# Patient Record
Sex: MALE | Race: WHITE | HISPANIC OR LATINO | URBAN - METROPOLITAN AREA
[De-identification: names, ages, dates, MRNs, and addresses within clinical notes are randomized per-mention and may not be internally consistent; named-entity substitution may affect disease eponyms.]

---

## 2023-05-04 ENCOUNTER — INPATIENT (INPATIENT)
Facility: HOSPITAL | Age: 58
LOS: 3 days | Discharge: HOME | End: 2023-05-08
Attending: INTERNAL MEDICINE | Admitting: STUDENT IN AN ORGANIZED HEALTH CARE EDUCATION/TRAINING PROGRAM
Payer: COMMERCIAL

## 2023-05-04 VITALS
RESPIRATION RATE: 16 BRPM | SYSTOLIC BLOOD PRESSURE: 114 MMHG | OXYGEN SATURATION: 100 % | WEIGHT: 169.98 LBS | TEMPERATURE: 99 F | DIASTOLIC BLOOD PRESSURE: 81 MMHG | HEIGHT: 68 IN | HEART RATE: 143 BPM

## 2023-05-04 DIAGNOSIS — K29.71 GASTRITIS, UNSPECIFIED, WITH BLEEDING: ICD-10-CM

## 2023-05-04 DIAGNOSIS — K26.4 CHRONIC OR UNSPECIFIED DUODENAL ULCER WITH HEMORRHAGE: ICD-10-CM

## 2023-05-04 DIAGNOSIS — R09.02 HYPOXEMIA: ICD-10-CM

## 2023-05-04 DIAGNOSIS — K57.91 DIVERTICULOSIS OF INTESTINE, PART UNSPECIFIED, WITHOUT PERFORATION OR ABSCESS WITH BLEEDING: ICD-10-CM

## 2023-05-04 DIAGNOSIS — Z71.6 TOBACCO ABUSE COUNSELING: ICD-10-CM

## 2023-05-04 LAB
ALBUMIN SERPL ELPH-MCNC: 3.1 G/DL — LOW (ref 3.3–5)
ALP SERPL-CCNC: 40 U/L — SIGNIFICANT CHANGE UP (ref 40–120)
ALT FLD-CCNC: 18 U/L — SIGNIFICANT CHANGE UP (ref 12–78)
ANION GAP SERPL CALC-SCNC: 2 MMOL/L — LOW (ref 5–17)
ANISOCYTOSIS BLD QL: SLIGHT — SIGNIFICANT CHANGE UP
APTT BLD: 28 SEC — SIGNIFICANT CHANGE UP (ref 27.5–35.5)
AST SERPL-CCNC: 14 U/L — LOW (ref 15–37)
BASOPHILS # BLD AUTO: 0.05 K/UL — SIGNIFICANT CHANGE UP (ref 0–0.2)
BASOPHILS NFR BLD AUTO: 0.5 % — SIGNIFICANT CHANGE UP (ref 0–2)
BILIRUB SERPL-MCNC: 0.2 MG/DL — SIGNIFICANT CHANGE UP (ref 0.2–1.2)
BLD GP AB SCN SERPL QL: SIGNIFICANT CHANGE UP
BUN SERPL-MCNC: 31 MG/DL — HIGH (ref 7–23)
CALCIUM SERPL-MCNC: 8.1 MG/DL — LOW (ref 8.5–10.1)
CHLORIDE SERPL-SCNC: 107 MMOL/L — SIGNIFICANT CHANGE UP (ref 96–108)
CO2 SERPL-SCNC: 27 MMOL/L — SIGNIFICANT CHANGE UP (ref 22–31)
CREAT SERPL-MCNC: 0.8 MG/DL — SIGNIFICANT CHANGE UP (ref 0.5–1.3)
EGFR: 103 ML/MIN/1.73M2 — SIGNIFICANT CHANGE UP
EOSINOPHIL # BLD AUTO: 0.04 K/UL — SIGNIFICANT CHANGE UP (ref 0–0.5)
EOSINOPHIL NFR BLD AUTO: 0.4 % — SIGNIFICANT CHANGE UP (ref 0–6)
GLUCOSE SERPL-MCNC: 127 MG/DL — HIGH (ref 70–99)
HCT VFR BLD CALC: 20.2 % — CRITICAL LOW (ref 39–50)
HGB BLD-MCNC: 6.9 G/DL — CRITICAL LOW (ref 13–17)
HYPOCHROMIA BLD QL: SLIGHT — SIGNIFICANT CHANGE UP
IMM GRANULOCYTES NFR BLD AUTO: 0.4 % — SIGNIFICANT CHANGE UP (ref 0–0.9)
INR BLD: 1.07 RATIO — SIGNIFICANT CHANGE UP (ref 0.88–1.16)
LYMPHOCYTES # BLD AUTO: 2.48 K/UL — SIGNIFICANT CHANGE UP (ref 1–3.3)
LYMPHOCYTES # BLD AUTO: 27 % — SIGNIFICANT CHANGE UP (ref 13–44)
MANUAL SMEAR VERIFICATION: SIGNIFICANT CHANGE UP
MCHC RBC-ENTMCNC: 31.7 PG — SIGNIFICANT CHANGE UP (ref 27–34)
MCHC RBC-ENTMCNC: 34.2 G/DL — SIGNIFICANT CHANGE UP (ref 32–36)
MCV RBC AUTO: 92.7 FL — SIGNIFICANT CHANGE UP (ref 80–100)
MONOCYTES # BLD AUTO: 0.49 K/UL — SIGNIFICANT CHANGE UP (ref 0–0.9)
MONOCYTES NFR BLD AUTO: 5.3 % — SIGNIFICANT CHANGE UP (ref 2–14)
NEUTROPHILS # BLD AUTO: 6.07 K/UL — SIGNIFICANT CHANGE UP (ref 1.8–7.4)
NEUTROPHILS NFR BLD AUTO: 66.4 % — SIGNIFICANT CHANGE UP (ref 43–77)
NRBC # BLD: 0 /100 WBCS — SIGNIFICANT CHANGE UP (ref 0–0)
PLAT MORPH BLD: NORMAL — SIGNIFICANT CHANGE UP
PLATELET # BLD AUTO: 194 K/UL — SIGNIFICANT CHANGE UP (ref 150–400)
POLYCHROMASIA BLD QL SMEAR: SLIGHT — SIGNIFICANT CHANGE UP
POTASSIUM SERPL-MCNC: 4.1 MMOL/L — SIGNIFICANT CHANGE UP (ref 3.5–5.3)
POTASSIUM SERPL-SCNC: 4.1 MMOL/L — SIGNIFICANT CHANGE UP (ref 3.5–5.3)
PROT SERPL-MCNC: 5.6 GM/DL — LOW (ref 6–8.3)
PROTHROM AB SERPL-ACNC: 12.8 SEC — SIGNIFICANT CHANGE UP (ref 10.5–13.4)
RBC # BLD: 2.18 M/UL — LOW (ref 4.2–5.8)
RBC # FLD: 12.6 % — SIGNIFICANT CHANGE UP (ref 10.3–14.5)
RBC BLD AUTO: ABNORMAL
SODIUM SERPL-SCNC: 136 MMOL/L — SIGNIFICANT CHANGE UP (ref 135–145)
WBC # BLD: 9.17 K/UL — SIGNIFICANT CHANGE UP (ref 3.8–10.5)
WBC # FLD AUTO: 9.17 K/UL — SIGNIFICANT CHANGE UP (ref 3.8–10.5)

## 2023-05-04 PROCEDURE — 99291 CRITICAL CARE FIRST HOUR: CPT

## 2023-05-04 PROCEDURE — 74174 CTA ABD&PLVS W/CONTRAST: CPT | Mod: 26,MA

## 2023-05-04 PROCEDURE — 93010 ELECTROCARDIOGRAM REPORT: CPT

## 2023-05-04 PROCEDURE — 99222 1ST HOSP IP/OBS MODERATE 55: CPT

## 2023-05-04 RX ORDER — ONDANSETRON 8 MG/1
4 TABLET, FILM COATED ORAL EVERY 6 HOURS
Refills: 0 | Status: DISCONTINUED | OUTPATIENT
Start: 2023-05-04 | End: 2023-05-08

## 2023-05-04 RX ORDER — SODIUM CHLORIDE 9 MG/ML
1000 INJECTION INTRAMUSCULAR; INTRAVENOUS; SUBCUTANEOUS ONCE
Refills: 0 | Status: COMPLETED | OUTPATIENT
Start: 2023-05-04 | End: 2023-05-04

## 2023-05-04 RX ORDER — PANTOPRAZOLE SODIUM 20 MG/1
80 TABLET, DELAYED RELEASE ORAL ONCE
Refills: 0 | Status: COMPLETED | OUTPATIENT
Start: 2023-05-04 | End: 2023-05-04

## 2023-05-04 RX ORDER — PANTOPRAZOLE SODIUM 20 MG/1
40 TABLET, DELAYED RELEASE ORAL EVERY 12 HOURS
Refills: 0 | Status: DISCONTINUED | OUTPATIENT
Start: 2023-05-04 | End: 2023-05-08

## 2023-05-04 RX ADMIN — SODIUM CHLORIDE 1000 MILLILITER(S): 9 INJECTION INTRAMUSCULAR; INTRAVENOUS; SUBCUTANEOUS at 19:31

## 2023-05-04 RX ADMIN — PANTOPRAZOLE SODIUM 80 MILLIGRAM(S): 20 TABLET, DELAYED RELEASE ORAL at 19:32

## 2023-05-04 NOTE — H&P ADULT - ASSESSMENT
56yo male with pmh gerd presenting with black stools over past 3-4 days which has never happened before.  Pt with anemia and mild diverticulosis on CT. Admit for Upper GI bleed.

## 2023-05-04 NOTE — H&P ADULT - PROBLEM SELECTOR PLAN 1
- Hgb 6.9  - Per Ed rectal exam with melanotic stool  - NPO  - IV PPI BID  - GI consult in AM.   - 1 unit PRBC given in ED. FU repeat CBC.   - Monitor if any large volume blood loss overnight.

## 2023-05-04 NOTE — ED PROVIDER NOTE - OBJECTIVE STATEMENT
56yo male with pmh gerd presenting with black stools.  For several weeks/ months patient has been having intermittent upper abdominal pain and has been taking omeprazole with improvement.  Had worsening pain sunday which has since improved but now denies pain.  Over past 3-4 days has been having black stools which has never happened before.  No brbpr.  No fevers, n/v.  No pshx.

## 2023-05-04 NOTE — PATIENT PROFILE ADULT - NSTOBACCOCESSATIONEDU4_GEN_A_NUR
IV discontinued, cath removed intact/MD Resident Rodgers Smoking even a single puff increases the likelihood of a full relapse, withdrawal symptoms peak within 1-2 weeks, but can persist for months

## 2023-05-04 NOTE — ED PROVIDER NOTE - CLINICAL SUMMARY MEDICAL DECISION MAKING FREE TEXT BOX
Upper/ epigastric abdominal pain for weeks/ months responding to omeprazole.  Suspect gastritis/ gerd with likely ulcer now causing gib.  Tachycardic, normotensive.  No ac use.  Asymptomatic now at rest.  Endorsing lightheadedness when standing but denies now.  Will get labs, type, CTA r/o significant active bleed, reassess and plan for admission with possible transfusion.

## 2023-05-04 NOTE — ED ADULT TRIAGE NOTE - CHIEF COMPLAINT QUOTE
patient BIB EMs for bloody stool for the past 4 dyas, pt states last bloody stool this morning. Pt voiced palpitation and dizziness.

## 2023-05-04 NOTE — H&P ADULT - NSHPPHYSICALEXAM_GEN_ALL_CORE
VITALS:   T(C): 36.9 (05-05-23 @ 05:38), Max: 37.5 (05-04-23 @ 23:35)  HR: 94 (05-05-23 @ 05:38) (79 - 143)  BP: 109/60 (05-05-23 @ 05:38) (100/60 - 124/65)  RR: 18 (05-05-23 @ 05:38) (16 - 18)  SpO2: 99% (05-05-23 @ 05:38) (97% - 100%)    GENERAL: NAD, lying in bed comfortably  HEAD:  Atraumatic, Normocephalic  EYES: EOMI, PERRLA, conjunctiva and sclera clear  ENT: Moist mucous membranes  NECK: Supple, No JVD  CHEST/LUNG: Clear to auscultation bilaterally; No rales, rhonchi, wheezing, or rubs. Unlabored respirations  HEART: Regular rate and rhythm; No murmurs, rubs, or gallops  ABDOMEN: BSx4; Soft, nontender, nondistended  EXTREMITIES:  2+ Peripheral Pulses, brisk capillary refill. No clubbing, cyanosis, or edema  NERVOUS SYSTEM:  A&Ox3, no focal deficits   SKIN: No rashes or lesions

## 2023-05-04 NOTE — ED PROVIDER NOTE - PHYSICAL EXAMINATION
General appearance: Nontoxic appearing, conversant, afebrile    Eyes: anicteric sclerae, ANN-MARIE, EOMI   HENT: Atraumatic; oropharynx clear, MMM and no ulcerations, no pharyngeal erythema or exudate   Neck: Trachea midline; Full range of motion, supple   Pulm: CTA bl, normal respiratory effort and no intercostal retractions, normal work of breathing   CV: Tachycardic, regular, No murmurs, rubs, or gallops   Abdomen: Soft, non-tender, non-distended; no guarding or rebound   Extremities: No peripheral edema, no gross deformities, FROM x4   Skin: Dry, normal temperature, turgor and texture; no rash   Psych: Appropriate affect, cooperative

## 2023-05-04 NOTE — H&P ADULT - NSHPREVIEWOFSYSTEMS_GEN_ALL_CORE
REVIEW OF SYSTEMS:    CONSTITUTIONAL: No weakness, fevers or chills  EYES/ENT: No visual changes;  No vertigo or throat pain   NECK: No pain or stiffness  RESPIRATORY: No cough, wheezing, hemoptysis; No shortness of breath  CARDIOVASCULAR: No chest pain or palpitations  GASTROINTESTINAL: No abdominal or epigastric pain. No nausea, vomiting, or hematemesis; No diarrhea or constipation. +melena - hematochezia.  GENITOURINARY: No dysuria, frequency or hematuria  NEUROLOGICAL: No numbness or weakness  SKIN: No itching, rashes

## 2023-05-04 NOTE — PATIENT PROFILE ADULT - NSPRESCRALCSIXMORE_GEN_A_NUR
Cardiology Office Visit    Tonny Sim  [id number]  1949    [facility]  [address] [place] [id number] [phone]      Dear Dilip Gerard MD,    I had the pleasure of seeing your patient at our [address] Cardiology [facility] office today 1/14/2021  As you know he is a pleasant  male with a medical history as described below  Reason for office visit: Follow for coronary artery disease, atrial fibrillation, hypertension, hyperlipidemia, mitral regurgitation and carotid artery disease  1  Coronary artery disease involving native coronary artery of native heart without angina pectoris  Assessment & Plan:  Coronary artery disease:  A  Non ST segment elevation myocardial infarction 10/01/2008  B   2 Taxus drug-eluting stents to the right coronary artery 10/01/2008  C  Moderate left main disease on cardiac catheterization 2008, 2012 and 2014  D  Cardiac catheterization 06/29/2016 with FFR less than 0 75 with left main 50% stenotic  E   CABG x 2  LIMA-LAD  SVG-OM 7/29/16  Patient denies any cardiac symptoms at present  He denies chest pain or change in baseline dyspnea on exertion  Nuclear stress test 5/4/18 showed no evidence of ischemia  He is currently on plavix and eliquis so he is not maintained on aspirin  Continue atorvastatin for goal LDL less than 70  Will plan surveillance stress test later 2021 unless symptoms warrant earlier testing  Orders:  -     POCT ECG  -     clopidogrel (Plavix) 75 mg tablet; Take 1 tablet (75 mg total) by mouth daily    2  S/P CABG (coronary artery bypass graft)  Assessment & Plan:  CABG x 2: LIMA-LAD  SVG-OM 7/29/16   [facility]        3  Essential hypertension  Assessment & Plan:  Systolic blood pressure is minimally elevated on exam   I have asked the patient to intermittently monitor at home and to call me if systolic blood pressures consistently above 145   If blood pressure remains elevated we may need to consider additional antihypertensive  Orders:  -     POCT ECG    4  Mixed hyperlipidemia  Assessment & Plan:  Atorvastatin 40 mg  Lipid panel 3/2/2020: C 115  T 122  H 33  L 58  Goal LDL < 70        5  PAF (paroxysmal atrial fibrillation) (HCC)  Assessment & Plan:  He remains in normal sinus rhythm on Sotalol 120 mg twice daily  ECG at last office visit showed normal sinus rhythm  He is  on Eliquis anticoagulation  He follows with EP (Dr Vincent Silvestre) at [facility] and was last seen 11/24/2020 at which time no changes are made to the patient's regimen (they did discuss Watchman)  6  Non-rheumatic mitral regurgitation  Assessment & Plan:  Echocardiogram 12/22/16 showed mild to moderate central mitral regurgitation  Repeat echocardiogram 02/17/2020 showed mild central mitral regurgitation  Will plan repeat echo in approximately 1-2 years given discrepancy of regurgitation  7  Carotid occlusion, right  Assessment & Plan:  Carotid Doppler 06/05/2017 with known occlusion of the right internal carotid artery  50 to 69% stenosis of the left internal carotid artery  Patient is being followed by vascular surgery at [facility]   Reportedly unchanged carotid 12/2/2020  Repeat carotid in 1 year if stable 2 year follow up with vascular per their recommendations  Continue aggressive medical therapy  Discussed signs and symptoms of stroke with the patient previously  8  Obesity (BMI 30-39  9)  Assessment & Plan: Body mass index is 33 25 kg/m²  Discussed the importance of resuming exercise program and trying to maintain a healthy diet  9  Dyspnea on exertion  Assessment & Plan:  Stable dyspnea on exertion  10  Peripheral vascular disease ([address] )  Assessment & Plan:  Carotid Doppler 06/05/2017 with known occlusion of the right internal carotid artery  50 to 69% stenosis of the left internal carotid artery  Never Patient is being followed by vascular surgery at [facility] (Dr Francisco Shore)  Carotid ultrasound 12/2/2020  Repeat carotid in 1 year if stable 2 year follow up with vascular per their recommendations  Continue aggressive medical therapy  Discussed signs and symptoms of stroke with the patient previously  HPI     Patient with history of coronary artery disease dating back to 10/2008 when he had DEIRDRE x 2 placed to an occluded right coronary artery  He ultimately underwent CABG x 2 with LIMA-LAD and SVG-OM 7/29/16  He had post operative atrial fibrillation and follows with Dr Saundra Ganser at [facility] for EP  He was last seen 6/2019  He is on sotalol  He has known carotid occlusion for which he follows with Dr Francisco Shore (vascular surgery)  He was last seen 11/20/19  I had last seen the patient 1/16/2020 at which time he was doing well  He denied any chest pain  He did note stable dyspnea on exertion  No lightheadedness or dizziness  He had transitioned to Eliquis from Coumadin/warfarin  I had recommended updated echocardiogram given history of moderate mitral regurgitation  Echocardiogram 2/17/2020 showed mild central mitral regurgitation  EF 60-65%  7/14/2020:  Patient returns to the office today for follow-up  He denies any chest pain  He denies any palpitations  He denies any lightheadedness and dizziness  Patient was seen by Zuleika Elmore 5/22/2020  No changes were made to his regimen  ECG at that time showed normal sinus rhythm per report  Patient is having a lot of knee pain  He reports that this has decreased his activity level  1/14/2021: Patient presents to the office today for follow up  He denies any chest pain  No shortness of breath  No LH/D  He has seen by urology and EP since I last saw him  He saw Dr Saundra Ganser 11/24/2020 (EP at [facility]) who made no changes to his medications  They did discuss Watchman procedure briefly   He was seen by urology 11/27/2020 and things were stable  Carotid 12/2/2020 not available for review  Patient Active Problem List   Diagnosis    Carotid occlusion, right    CAD (coronary artery disease)    Hypertension    Hyperlipidemia    Non-rheumatic mitral regurgitation    Obesity (BMI 30-39  9)    PAF [facility]oxysmal [facility] fibrillation) ([facility])    S/P CABG (coronary artery bypass graft)    Dyspnea on exertion    Peripheral vascular disease ([facility])     Past Medical History:   Diagnosis Date    Atrial fibrillation (HCC)     Carotid occlusion, right     Coronary artery disease     GERD (gastroesophageal reflux disease)     Hyperlipidemia     Hypertension     Myocardial infarct [facility])      Social History     Socioeconomic History    Marital status: /Civil Union     Spouse name: Not on file    Number of children: Not on file    Years of education: Not on file    Highest education level: Not on file   Occupational History    Occupation: Retired   Tobacco Use    Smoking status: Former Smoker    Smokeless tobacco: Never Used   Vaping Use    Vaping Use: Never used   Substance and Sexual Activity    Alcohol use: Yes     Alcohol/week: 1 0 standard drink     Types: 1 Cans of beer per week    Drug use: Never    Sexual activity: Not on file     Comment: Defer   Other Topics Concern    Not on file   Social History Narrative    Not on file     Social Determinants of Health     Financial Resource Strain: Not on file   Food Insecurity: Not on file   Transportation Needs: Not on file   Physical Activity: Not on file   Stress: Not on file   Social Connections: Not on file   Intimate Partner Violence: Not on file   Housing Stability: Not on file      Family History   Problem Relation Age of Onset    Heart disease Mother     Heart failure Father      Past Surgical History:   Procedure Laterality Date    CARDIAC CATHETERIZATION  10/01/2008    RCA with 100% subacute occlusion  DEIRDRE x2 to RCA  Ostial left main disease with FFR 0 78       CARDIAC CATHETERIZATION  03/16/2012    Moderate 50% ostial left main  FFR 0 82  Distal RCA 40%  No intervention   CARDIAC CATHETERIZATION  11/24/2014    Ostial left main 60%  Ostial left circumflex 70%   CARDIAC CATHETERIZATION  06/29/2016    Left main 50%  FFR was less than 0 75  CABG recommended   CARDIOVERSION  07/01/2017    CEREBRAL ANGIOGRAM  12/12/2013    Carotid angiography  Left internal carotid artery 40%  Right internal carotid artery 100% occlusion   CHOLECYSTECTOMY      CORONARY ARTERY BYPASS GRAFT  07/29/2016    LIMA-LAD  SVG-OM   LUMBAR DISC SURGERY      TONSILLECTOMY         * Current medications reviewed  No Known Allergies    Cardiac Test Results:     ECG 1/14/2021: Normal sinus rhythm  Normal ECG  Lipid panel 3/2/2020: C 115  T 122  H 33  L 58  Echocardiogram 02/17/2020:  Mildly dilated left ventricle with normal left ventricular systolic function  EF estimated at 60-65%  Mild concentric left ventricular hypertrophy  Abnormal septal motion likely due to known cardiac surgery  Mild diastolic dysfunction  Moderately dilated right ventricle with normal right ventricular systolic function  Mild left and right atrial enlargement  Aortic sclerosis without stenosis  Mitral annular calcification  Mild central mitral regurgitation  Mild tricuspid regurgitation  Estimated PASP 30 mmHg         Cv Stress Nuclear Pharm: Result Date: 5/4/2018  No evidence of ECG changes to suggest ischemia  · The patient reported shortness of breath during the stress test  · Blood pressure demonstrated a hypotensive response to stress  · There is a small to moderate sized non-reversible (scar/infarct) defect of mild severity present in the basal anterior and basal inferior wall(s), mostly normalized with CT correction, consistent with diaphragmatic attenuation  · Left ventricular perfusion is probably normal  No ischemia · This is a low risk study       Arterial duplex 11/06/2013:  No abdominal aortic aneurysm   No significant aortoiliac disease  Stress echocardiogram 06/02/2016:  Fernando   7 minutes 42 seconds   8 3 Mets   Hypotension with exercise   66% of maximum predicted heart rate   Frequent PACs and PVCs   Atrial tachycardia   No ischemia at submaximal heart rate      Carotid duplex 06/05/2017:  Occluded right internal carotid artery  50 to 69% left internal carotid artery stenosis    Twelve month follow-up recommended      Echocardiogram 12/22/2016:  Mildly dilated LV   EF 55%   Abnormal septal motion   Mild concentric left ventricular hypertrophy   Mildly enlarged RV with normal RV systolic function   Mild right atrial enlargement/left atrial enlargement   Aortic sclerosis   No AI   MAC   Mild to moderate central MR   Mild TR   Top-normal PASP 39 mmHg         ECG: Normal sinus rhythm with first degree AV block      Lipid panel 11/4/19: Cholesterol 114  Triglycerides 116  HDL 42  LDL 49  Review of Systems:    Review of Systems   Constitutional: Negative for activity change, appetite change and fatigue  HENT: Negative for congestion, hearing loss, tinnitus and trouble swallowing  Eyes: Negative for visual disturbance  Respiratory: Negative for cough, chest tightness, shortness of breath and wheezing  Cardiovascular: Negative for chest pain, palpitations and leg swelling  Gastrointestinal: Negative for abdominal distention, abdominal pain, nausea and vomiting  Genitourinary: Negative for difficulty urinating  Musculoskeletal: Positive for arthralgias  Skin: Negative for rash  Neurological: Negative for dizziness, syncope and light-headedness  Hematological: Does not bruise/bleed easily  Psychiatric/Behavioral: Negative for confusion  The patient is not nervous/anxious  All other systems reviewed and are negative          Vitals:    01/14/21 0905 01/14/21 0936   BP: 138/80 138/80   BP Location: Left arm Left arm   Patient Position: Sitting    Pulse: 80 Temp: 97 5 °F (36 4 °C)    Weight: 117 kg (259 lb)    Height: 6' 2" (1 88 m)      Vitals:    01/14/21 0905   Weight: 117 kg (259 lb)     Height: 6' 2" (188 cm)   Body mass index is 33 25 kg/m²  Physical Exam  Constitutional:       Appearance: He is well-developed and well-nourished  HENT:      Head: Normocephalic and atraumatic  Eyes:      Conjunctiva/sclera: Conjunctivae normal       Pupils: Pupils are equal, round, and reactive to light  Neck:      Vascular: No JVD  Cardiovascular:      Rate and Rhythm: Normal rate and regular rhythm  Occasional extrasystoles are present  Heart sounds: Normal heart sounds  No murmur heard  No friction rub  No gallop  Pulmonary:      Effort: Pulmonary effort is normal       Breath sounds: Normal breath sounds  Abdominal:      General: Bowel sounds are normal       Palpations: Abdomen is soft  Musculoskeletal:         General: Edema (Trace) present  Cervical back: Normal range of motion  Skin:     General: Skin is warm and dry  Neurological:      Mental Status: He is alert and oriented to person, place, and time     Psychiatric:         Mood and Affect: Mood and affect normal          Behavior: Behavior normal

## 2023-05-04 NOTE — PATIENT PROFILE ADULT - NSTOBACCOCESSATIONEDU3_GEN_A_NUR
Reminder: Please contact the Coumadin Clinic at [phone] when you have medication changes. Examples, new medications, antibiotics, discontinued medications, new supplements, missed doses of warfarin or if you took extra doses of warfarin. This also includes OTC medications. Notifying us helps reduce the possibility of high and low INR's. In addition, if warfarin needs to be held for any procedures, please have surgeon or physician's office contact us before holding anticoagulant. Thanks, [facility].
Learning behavioral activities to cope with urges.  For example, distraction and changing routines

## 2023-05-04 NOTE — ED PROVIDER NOTE - CRITICAL CARE ATTENDING CONTRIBUTION TO CARE
Upon my evaluation, this patient had a high probability of imminent or life-threatening deterioration due to gastrointestinal bleeding requiring transfusion, which required my direct attention, intervention, and personal management.  The patient has a  medical condition that impairs one or more vital organ systems.  Frequent personal assessment and adjustment of medical interventions was performed.      I have personally provided 45 minutes of critical care time exclusive of time spent on separately billable procedures. Time includes review of laboratory data, radiology results, discussion with consultants, patient and family; monitoring for potential decompensation, as well as time spent retrieving data and reviewing the chart and documenting the visit. Interventions were performed as documented above.

## 2023-05-04 NOTE — H&P ADULT - NSHPLABSRESULTS_GEN_ALL_CORE
=======================================================  Labs:                        6.9    9.17  )-----------( 194      ( 04 May 2023 19:08 )             20.2     05-04    136  |  107  |  31<H>  ----------------------------<  127<H>  4.1   |  27  |  0.80    Ca    8.1<L>      04 May 2023 19:08    TPro  5.6<L>  /  Alb  3.1<L>  /  TBili  0.2  /  DBili  x   /  AST  14<L>  /  ALT  18  /  AlkPhos  40  05-04      Creatinine, Serum: 0.80 mg/dL (05-04-23 @ 19:08)            WBC Count: 9.17 K/uL (05-04-23 @ 19:08)        Alkaline Phosphatase, Serum: 40 U/L (05-04-23 @ 19:08)  Alanine Aminotransferase (ALT/SGPT): 18 U/L (05-04-23 @ 19:08)  Aspartate Aminotransferase (AST/SGOT): 14 U/L (05-04-23 @ 19:08)  Bilirubin Total, Serum: 0.2 mg/dL (05-04-23 @ 19:08)      < from: CT Angio Abdomen and Pelvis w/ IV Cont (05.04.23 @ 20:18) >      FINDINGS:  LOWER CHEST: Trace bibasilar atelectasis.    LIVER: Within normal limits.  BILE DUCTS: Normal caliber.  GALLBLADDER: Within normal limits.  SPLEEN: Within normal limits.  PANCREAS: Within normal limits.  ADRENALS: Within normal limits.  KIDNEYS/URETERS: Within normal limits.    BLADDER: Within normal limits.  REPRODUCTIVE ORGANS: Prostate is mildly enlarged.    BOWEL: No bowel obstruction. Colonic diverticulosis. Submucosal fat   deposition within the appendix, which is normal in caliber.  PERITONEUM: No ascites.  VESSELS: Within normal limits.  RETROPERITONEUM/LYMPH NODES: No lymphadenopathy.  ABDOMINAL WALL: Small fat-containing umbilical hernia.  BONES: Mild degenerative changes.    IMPRESSION:  No active GI bleed identified.    Scattered colonic diverticulosis.    < end of copied text >

## 2023-05-04 NOTE — H&P ADULT - HISTORY OF PRESENT ILLNESS
56yo male with pmh gerd presenting with black stools.  For several weeks/ months patient has been having intermittent upper abdominal pain and has been taking omeprazole with improvement.  Had worsening pain sunday which has since improved but now denies pain.  Over past 3-4 days has been having black stools which has never happened before.  No brbpr.  No fevers, n/v.  No pshx

## 2023-05-04 NOTE — PATIENT PROFILE ADULT - FALL HARM RISK - HARM RISK INTERVENTIONS

## 2023-05-05 DIAGNOSIS — K92.2 GASTROINTESTINAL HEMORRHAGE, UNSPECIFIED: ICD-10-CM

## 2023-05-05 DIAGNOSIS — Z29.9 ENCOUNTER FOR PROPHYLACTIC MEASURES, UNSPECIFIED: ICD-10-CM

## 2023-05-05 DIAGNOSIS — F17.200 NICOTINE DEPENDENCE, UNSPECIFIED, UNCOMPLICATED: ICD-10-CM

## 2023-05-05 DIAGNOSIS — K21.9 GASTRO-ESOPHAGEAL REFLUX DISEASE WITHOUT ESOPHAGITIS: ICD-10-CM

## 2023-05-05 LAB
APTT BLD: 27.6 SEC — SIGNIFICANT CHANGE UP (ref 27.5–35.5)
BASOPHILS # BLD AUTO: 0.04 K/UL — SIGNIFICANT CHANGE UP (ref 0–0.2)
BASOPHILS NFR BLD AUTO: 0.5 % — SIGNIFICANT CHANGE UP (ref 0–2)
EOSINOPHIL # BLD AUTO: 0.09 K/UL — SIGNIFICANT CHANGE UP (ref 0–0.5)
EOSINOPHIL NFR BLD AUTO: 1.2 % — SIGNIFICANT CHANGE UP (ref 0–6)
HCT VFR BLD CALC: 19.1 % — CRITICAL LOW (ref 39–50)
HCT VFR BLD CALC: 22.9 % — LOW (ref 39–50)
HCT VFR BLD CALC: 23.4 % — LOW (ref 39–50)
HGB BLD-MCNC: 6.5 G/DL — CRITICAL LOW (ref 13–17)
HGB BLD-MCNC: 7.8 G/DL — LOW (ref 13–17)
HGB BLD-MCNC: 8.1 G/DL — LOW (ref 13–17)
IMM GRANULOCYTES NFR BLD AUTO: 0.5 % — SIGNIFICANT CHANGE UP (ref 0–0.9)
INR BLD: 1.16 RATIO — SIGNIFICANT CHANGE UP (ref 0.88–1.16)
IRON SATN MFR SERPL: 24 % — SIGNIFICANT CHANGE UP (ref 16–55)
IRON SATN MFR SERPL: 64 UG/DL — SIGNIFICANT CHANGE UP (ref 45–165)
LYMPHOCYTES # BLD AUTO: 2.57 K/UL — SIGNIFICANT CHANGE UP (ref 1–3.3)
LYMPHOCYTES # BLD AUTO: 32.9 % — SIGNIFICANT CHANGE UP (ref 13–44)
MCHC RBC-ENTMCNC: 31.2 PG — SIGNIFICANT CHANGE UP (ref 27–34)
MCHC RBC-ENTMCNC: 31.4 PG — SIGNIFICANT CHANGE UP (ref 27–34)
MCHC RBC-ENTMCNC: 31.5 PG — SIGNIFICANT CHANGE UP (ref 27–34)
MCHC RBC-ENTMCNC: 34 G/DL — SIGNIFICANT CHANGE UP (ref 32–36)
MCHC RBC-ENTMCNC: 34.1 G/DL — SIGNIFICANT CHANGE UP (ref 32–36)
MCHC RBC-ENTMCNC: 34.6 G/DL — SIGNIFICANT CHANGE UP (ref 32–36)
MCV RBC AUTO: 91.1 FL — SIGNIFICANT CHANGE UP (ref 80–100)
MCV RBC AUTO: 91.6 FL — SIGNIFICANT CHANGE UP (ref 80–100)
MCV RBC AUTO: 92.3 FL — SIGNIFICANT CHANGE UP (ref 80–100)
MONOCYTES # BLD AUTO: 0.56 K/UL — SIGNIFICANT CHANGE UP (ref 0–0.9)
MONOCYTES NFR BLD AUTO: 7.2 % — SIGNIFICANT CHANGE UP (ref 2–14)
NEUTROPHILS # BLD AUTO: 4.51 K/UL — SIGNIFICANT CHANGE UP (ref 1.8–7.4)
NEUTROPHILS NFR BLD AUTO: 57.7 % — SIGNIFICANT CHANGE UP (ref 43–77)
NRBC # BLD: 0 /100 WBCS — SIGNIFICANT CHANGE UP (ref 0–0)
PLATELET # BLD AUTO: 150 K/UL — SIGNIFICANT CHANGE UP (ref 150–400)
PLATELET # BLD AUTO: 158 K/UL — SIGNIFICANT CHANGE UP (ref 150–400)
PLATELET # BLD AUTO: 163 K/UL — SIGNIFICANT CHANGE UP (ref 150–400)
PROTHROM AB SERPL-ACNC: 14 SEC — HIGH (ref 10.5–13.4)
RBC # BLD: 2.07 M/UL — LOW (ref 4.2–5.8)
RBC # BLD: 2.5 M/UL — LOW (ref 4.2–5.8)
RBC # BLD: 2.57 M/UL — LOW (ref 4.2–5.8)
RBC # FLD: 13.2 % — SIGNIFICANT CHANGE UP (ref 10.3–14.5)
RBC # FLD: 13.2 % — SIGNIFICANT CHANGE UP (ref 10.3–14.5)
RBC # FLD: 13.5 % — SIGNIFICANT CHANGE UP (ref 10.3–14.5)
TIBC SERPL-MCNC: 263 UG/DL — SIGNIFICANT CHANGE UP (ref 220–430)
UIBC SERPL-MCNC: 199 UG/DL — SIGNIFICANT CHANGE UP (ref 110–370)
WBC # BLD: 7.81 K/UL — SIGNIFICANT CHANGE UP (ref 3.8–10.5)
WBC # BLD: 8.05 K/UL — SIGNIFICANT CHANGE UP (ref 3.8–10.5)
WBC # BLD: 8.79 K/UL — SIGNIFICANT CHANGE UP (ref 3.8–10.5)
WBC # FLD AUTO: 7.81 K/UL — SIGNIFICANT CHANGE UP (ref 3.8–10.5)
WBC # FLD AUTO: 8.05 K/UL — SIGNIFICANT CHANGE UP (ref 3.8–10.5)
WBC # FLD AUTO: 8.79 K/UL — SIGNIFICANT CHANGE UP (ref 3.8–10.5)

## 2023-05-05 PROCEDURE — 88312 SPECIAL STAINS GROUP 1: CPT | Mod: 26

## 2023-05-05 PROCEDURE — 88305 TISSUE EXAM BY PATHOLOGIST: CPT | Mod: 26

## 2023-05-05 PROCEDURE — 99291 CRITICAL CARE FIRST HOUR: CPT

## 2023-05-05 PROCEDURE — 43255 EGD CONTROL BLEEDING ANY: CPT

## 2023-05-05 PROCEDURE — 99232 SBSQ HOSP IP/OBS MODERATE 35: CPT

## 2023-05-05 RX ORDER — HYDROMORPHONE HYDROCHLORIDE 2 MG/ML
0.5 INJECTION INTRAMUSCULAR; INTRAVENOUS; SUBCUTANEOUS
Refills: 0 | Status: DISCONTINUED | OUTPATIENT
Start: 2023-05-05 | End: 2023-05-05

## 2023-05-05 RX ORDER — CHLORHEXIDINE GLUCONATE 213 G/1000ML
1 SOLUTION TOPICAL
Refills: 0 | Status: DISCONTINUED | OUTPATIENT
Start: 2023-05-05 | End: 2023-05-08

## 2023-05-05 RX ORDER — SODIUM CHLORIDE 9 MG/ML
1000 INJECTION, SOLUTION INTRAVENOUS
Refills: 0 | Status: DISCONTINUED | OUTPATIENT
Start: 2023-05-05 | End: 2023-05-05

## 2023-05-05 RX ADMIN — PANTOPRAZOLE SODIUM 40 MILLIGRAM(S): 20 TABLET, DELAYED RELEASE ORAL at 17:35

## 2023-05-05 RX ADMIN — PANTOPRAZOLE SODIUM 40 MILLIGRAM(S): 20 TABLET, DELAYED RELEASE ORAL at 06:17

## 2023-05-05 NOTE — PROVIDER CONTACT NOTE (CRITICAL VALUE NOTIFICATION) - ASSESSMENT
Patient alert and oriented to place, time and situation. Patient has no current active bleeding. Patient has had no BM, passing gas and urine is yellow and clear.

## 2023-05-05 NOTE — CHART NOTE - NSCHARTNOTEFT_GEN_A_CORE
[address]  ALEX BISHOP, 57y, Male  [id number]    Notified by RN that the above patient had a critical value of: Hgb - 6.5. Patient is admitted with upper GIB and is s/p 1 unit PRBCs for Hgb of 6.9. Patient is already on PPI.       Interventions Taken:  1) 1 unit PRBCs  2)Repeat CBC post transfusion        T(C): 36.9 (05-05-23 @ 05:38), Max: 37.5 (05-04-23 @ 23:35)  HR: 94 (05-05-23 @ 05:38) (79 - 143)  BP: 109/60 (05-05-23 @ 05:38) (100/60 - 124/65)  RR: 18 (05-05-23 @ 05:38) (16 - 18)  SpO2: 99% (05-05-23 @ 05:38) (97% - 100%)  CBC Full  -  ( 05 May 2023 06:35 )  WBC Count : 7.81 K/uL  RBC Count : 2.07 M/uL  Hemoglobin : 6.5 g/dL  Hematocrit : 19.1 %  Platelet Count - Automated : 150 K/uL  Mean Cell Volume : 92.3 fl  Mean Cell Hemoglobin : 31.4 pg  Mean Cell Hemoglobin Concentration : 34.0 g/dL  Auto Neutrophil # : 4.51 K/uL  Auto Lymphocyte # : 2.57 K/uL  Auto Monocyte # : 0.56 K/uL  Auto Eosinophil # : 0.09 K/uL  Auto Basophil # : 0.04 K/uL  Auto Neutrophil % : 57.7 %  Auto Lymphocyte % : 32.9 %  Auto Monocyte % : 7.2 %  Auto Eosinophil % : 1.2 %  Auto Basophil % : 0.5 %    05-04    136  |  107  |  31<H>  ----------------------------<  127<H>  4.1   |  27  |  0.80    Ca    8.1<L>      04 May 2023 19:08    TPro  5.6<L>  /  Alb  3.1<L>  /  TBili  0.2  /  DBili  x   /  AST  14<L>  /  ALT  18  /  AlkPhos  40  05-04            Sima Fry NP  Department of Medicine

## 2023-05-05 NOTE — CONSULT NOTE ADULT - ASSESSMENT
Assessment: 57M with hx of GERD admitted with melena and abdominal pain. Upon admission found to have hgb 6.9, not responsive to 2 PRBCS. EGD performed by GI today showing 1.5 cm duodonal ulcer with visible vessel that was cauterized. ICU consulted for hemodynamic monitoring post procedure.       Plan:   Neuro: A0x4, at baseline. Pain control   Resp: no active issues   Cardiac: hemodynamically stable, maintain MAP > 65.   GI: advance to clears per GI, PPI BID for 72 hrs. MIVF for 12 hours   : monitor I&Os   Heme: s/p 2 PRBC; duodenal ulcer with visible vessel found on EGD with cauterization. Will monitor CBC q8hr.   DVT: SCDs    Dispo: ICU for hemodynamic monitoring post EGD    *Planof care discussed with ICU attending Christina KING

## 2023-05-05 NOTE — BRIEF OPERATIVE NOTE - OPERATION/FINDINGS
EGD report - see photos in paper chart  Indication - melena, inadequate response to transfusion  Exam to d2  Esophagus - normal  Stomach - mild gastritis. Biopsied for H. pylori.   Duodenum - 1.5cm ulcer in duodenal bulb with surrounding hyperemia and visible vessel within. Treated with 2cc of 1/10,000 epinephrine and bipolar cautery with good effect. D2 normal.     Recommend:   - clears tonight, if hgb stable tomorrow can advance diet  - IV BID PPI 40mg x72h  - await biopsies, treat H. pylori if positive

## 2023-05-05 NOTE — CONSULT NOTE ADULT - SUBJECTIVE AND OBJECTIVE BOX
57M with hx of GERD presenting for evaluation of black stool x2-3 days. Hgb found to be 6.9, no response to 1 unit PRBC, after second unit only went up by 1 point. This has never happened before. No N/V. Rare NSAID use. Last bm 30 minutes before my evaluation.    PMH/PSH--  GERD  R inguinal hernia repair  Plate in L thigh    Allergies--  No Known Allergies    Medications--  Other: ondansetron Injectable PRN  pantoprazole  Injectable    Social History--  EtOH: denies   Tobacco: former  Drug Use: denies     Family/Marital History--  No pertinent family history in first degree relatives    Review of Systems:  A >=10-point review of systems was obtained.     Pertinent positives and negatives--  Constitutional: No fevers. No Chills. No Rigors.   Eyes: No visual sx or lightheadedness  ENMT: No hearing trouble or throat pain.  Cardiovascular: No chest pain. No palpitations.  Respiratory: No shortness of breath. No cough.  Gastrointestinal: No nausea or vomiting. +melena.  Musculoskeletal: No arthralgia or myalgia  Skin: No rash or lesions  Neurologic: No weakness or disorientation  Psychiatric: Pleasant. Appropriate affect.  Endocrine: No polyuria or polydipsia  Heme/Lymphatic: Denies easy bruising or immune issues    Review of systems otherwise negative except as previously noted.    Physical Exam--  Vital Signs: T(F): 97.6 (05-05-23 @ 14:45), Max: 99.5 (05-04-23 @ 23:35)  HR: 86 (05-05-23 @ 14:45)  BP: 109/69 (05-05-23 @ 14:45)  RR: 18 (05-05-23 @ 14:45)  SpO2: 98% (05-05-23 @ 12:05)    General: Nontoxic-appearing in no acute distress.  HEENT: AT/NC. Anicteric. Conjunctiva pink and moist.   Neck: Not rigid. No sense of mass.  Nodes: None palpable.  Lungs: Clear bilaterally without rales, wheezing or rhonchi  Heart: Regular rate and rhythm. No Murmur. No rub. No gallop.   Abdomen: Soft. Nondistended. Nontender.   Extremities: No cyanosis or clubbing.   Skin: Warm. Dry. Good turgor. No rash.   Psychiatric: Appropriate affect and mood for situation.     Laboratory & Imaging Data--  CBC                        7.8    8.05  )-----------( 158      ( 05 May 2023 16:07 )             22.9       Chemistries  05-04    136  |  107  |  31<H>  ----------------------------<  127<H>  4.1   |  27  |  0.80    Ca    8.1<L>      04 May 2023 19:08    TPro  5.6<L>  /  Alb  3.1<L>  /  TBili  0.2  /  DBili  x   /  AST  14<L>  /  ALT  18  /  AlkPhos  40  05-04      ACC: [id number] EXAM:  CT ANGIO ABD PELV (W)AW IC   ORDERED BY: MARGY HURLEY     PROCEDURE DATE:  05/04/2023          INTERPRETATION:  CLINICAL INFORMATION: Upper GI bleed, bloody stools    COMPARISON: None.    CONTRAST/COMPLICATIONS:  IV Contrast: Omnipaque 350  96 cc administered   04 cc discarded  Oral Contrast: NONE  Complications: None reported at time of study completion    PROCEDURE:  CT of the Abdomen and Pelvis was performed.  Precontrast, Arterial and Delayed phases were performed.  Sagittal and coronal reformats were performed.    FINDINGS:  LOWER CHEST: Trace bibasilar atelectasis.    LIVER: Within normal limits.  BILE DUCTS: Normal caliber.  GALLBLADDER: Within normal limits.  SPLEEN: Within normal limits.  PANCREAS: Within normal limits.  ADRENALS: Within normal limits.  KIDNEYS/URETERS: Within normal limits.    BLADDER: Within normal limits.  REPRODUCTIVE ORGANS: Prostate is mildly enlarged.    BOWEL: No bowel obstruction. Colonic diverticulosis. Submucosal fat   deposition within the appendix, which is normal in caliber.  PERITONEUM: No ascites.  VESSELS: Within normal limits.  RETROPERITONEUM/LYMPH NODES: No lymphadenopathy.  ABDOMINAL WALL: Small fat-containing umbilical hernia.  BONES: Mild degenerative changes.    IMPRESSION:  No active GI bleed identified.    Scattered colonic diverticulosis.    Impression: 57M with hx of GERD with upper GI bleeding. DDx ulcer vs angioectasia vs less likely neoplasm.     Recommend:   - NPO  - IV PPI 40mg BID  - please add iron studies, TIBC, ferritin, B12/folate to pre-transfusion labs  - plan for EGD today  
HPI: 57M with hx of GERD presenting to ED for evaluation of black stool x2-3 days with abdominal pain for about 1 month. Hgb found to be 6.9, no response to 1 unit PRBC, after second unit only went up by 1 point. Per patient this has never happened before. Denies N/V/D/NSAID use. EGD 5/5 with GI showing gastritis with 1.5 cm duodonal ulcer with visible vessel that was cauterized. ICU consulted for hemodynamic monitoring post procedure.     ROS:   negative except described in HPI     PMH/PSH:   GERD  R inguinal hernia repair  Plate in L thigh    Allergies:   No Known Allergies    Social History:   EtOH: denies   Tobacco: former  Drug Use: denies     MEDICATIONS  (STANDING):  chlorhexidine 2% Cloths 1 Application(s) Topical <User Schedule>  lactated ringers. 1000 milliLiter(s) (50 mL/Hr) IV Continuous <Continuous>  pantoprazole  Injectable 40 milliGRAM(s) IV Push every 12 hours    MEDICATIONS  (PRN):  ondansetron Injectable 4 milliGRAM(s) IV Push every 6 hours PRN Nausea    ICU Vital Signs Last 24 Hrs  T(C): 37 (05 May 2023 18:22), Max: 37.5 (04 May 2023 23:35)  T(F): 98.6 (05 May 2023 18:22), Max: 99.5 (04 May 2023 23:35)  HR: 87 (05 May 2023 18:27) (79 - 109)  BP: 110/72 (05 May 2023 18:27) (100/60 - 124/65)  BP(mean): --  ABP: --  ABP(mean): --  RR: 13 (05 May 2023 18:27) (13 - 18)  SpO2: 97% (05 May 2023 18:27) (95% - 100%)    O2 Parameters below as of 05 May 2023 18:22  Patient On (Oxygen Delivery Method): room air    PE:   General: No acute distress.   HEENT: Pupils equal and symmetrically reactive to light.  PULM: Clear to auscultation bilaterally.  CVS: Regular rate and rhythm, no murmurs, rubs, or gallops.  ABD: Soft, nondistended, no masses.   EXT: No edema.  SKIN: Warm and well perfused, no rashes.      LABS:                          7.8    8.05  )-----------( 158      ( 05 May 2023 16:07 )             22.9     05-04    136  |  107  |  31<H>  ----------------------------<  127<H>  4.1   |  27  |  0.80    Ca    8.1<L>      04 May 2023 19:08    TPro  5.6<L>  /  Alb  3.1<L>  /  TBili  0.2  /  DBili  x   /  AST  14<L>  /  ALT  18  /  AlkPhos  40  05-04    LIVER FUNCTIONS - ( 04 May 2023 19:08 )  Alb: 3.1 g/dL / Pro: 5.6 gm/dL / ALK PHOS: 40 U/L / ALT: 18 U/L / AST: 14 U/L / GGT: x         thy  PT/INR - ( 05 May 2023 06:35 )   PT: 14.0 sec;   INR: 1.16 ratio         PTT - ( 05 May 2023 06:35 )  PTT:27.6 sec        ACC: [id number] EXAM:  CT ANGIO ABD PELV (W)AW IC   ORDERED BY: MARGY HURLEY   PROCEDURE DATE:  05/04/2023      INTERPRETATION:  CLINICAL INFORMATION: Upper GI bleed, bloody stools    COMPARISON: None.    CONTRAST/COMPLICATIONS:  IV Contrast: Omnipaque 350  96 cc administered   04 cc discarded  Oral Contrast: NONE  Complications: None reported at time of study completion    PROCEDURE:  CT of the Abdomen and Pelvis was performed.  Precontrast, Arterial and Delayed phases were performed.  Sagittal and coronal reformats were performed.    FINDINGS:  LOWER CHEST: Trace bibasilar atelectasis.    LIVER: Within normal limits.  BILE DUCTS: Normal caliber.  GALLBLADDER: Within normal limits.  SPLEEN: Within normal limits.  PANCREAS: Within normal limits.  ADRENALS: Within normal limits.  KIDNEYS/URETERS: Within normal limits.    BLADDER: Within normal limits.  REPRODUCTIVE ORGANS: Prostate is mildly enlarged.    BOWEL: No bowel obstruction. Colonic diverticulosis. Submucosal fat   deposition within the appendix, which is normal in caliber.  PERITONEUM: No ascites.  VESSELS: Within normal limits.  RETROPERITONEUM/LYMPH NODES: No lymphadenopathy.  ABDOMINAL WALL: Small fat-containing umbilical hernia.  BONES: Mild degenerative changes.    IMPRESSION:  No active GI bleed identified.    Scattered colonic diverticulosis      5/5/23:   EGD report - see photos in paper chart  Indication - melena, inadequate response to transfusion  Exam to d2  Esophagus - normal  Stomach - mild gastritis. Biopsied for H. pylori.   Duodenum - 1.5cm ulcer in duodenal bulb with surrounding hyperemia and visible vessel within. Treated with 2cc of 1/10,000 epinephrine and bipolar cautery with good effect. D2 normal.       
no

## 2023-05-05 NOTE — CONSULT NOTE ADULT - CRITICAL CARE ATTENDING COMMENT
57M w/ GERD. Presents w/ black stool and abdominal pain. Found to have anemia to 6.9 s/p 2 PRBC. Pt underwent EGD this afternoon, showing mild gastritis and 1.5 cm duodenal ulcer and visible vessel s/p cautery and epi. ICU consulted for monitoring overnight.     #Neuro - no active issues  #CV - HD monitoring   #Pulm - placed on O2 for hypoxia - pt is current smoker  #ID- no active infectious processes; biopsy sent for H. pylori  #Renal/metabolic - normal renal function, monitor I/Os and electrolytes  #GI- s/p EGD found to have large duodenal ulcer w/ visible vessel s/p cautery and epi, no active bleeding; clear liquid diet, continue PPI bid  #Heme - acute blood loss anemia due to GIB, s/p 2 PRBCs total; monitor CBC q6-q8 and transfuse for hgb <7  #Endo - no active issues, monitor BG  #PPx - SCDs in setting of GIB  #Dispo- admit to to ICU for HD and CBC monitoring; full code

## 2023-05-05 NOTE — PROGRESS NOTE ADULT - PROBLEM SELECTOR PLAN 1
- pt states his father and brother had GI bleed in the past. Never checked H. pylori  - Hgb 6.9, s/p 1PRBC in ED, Hb 6.5 this AM   - giving another 1PRBC  - Per Ed rectal exam with melanotic stool  - NPO  - IV PPI BID  - GI consulted Dr. ARUN REYES post- trasfusion CBC.

## 2023-05-06 LAB
ALBUMIN SERPL ELPH-MCNC: 2.7 G/DL — LOW (ref 3.3–5)
ALP SERPL-CCNC: 35 U/L — LOW (ref 40–120)
ALT FLD-CCNC: 17 U/L — SIGNIFICANT CHANGE UP (ref 12–78)
ANION GAP SERPL CALC-SCNC: 3 MMOL/L — LOW (ref 5–17)
ANION GAP SERPL CALC-SCNC: 5 MMOL/L — SIGNIFICANT CHANGE UP (ref 5–17)
AST SERPL-CCNC: 15 U/L — SIGNIFICANT CHANGE UP (ref 15–37)
BASOPHILS # BLD AUTO: 0.04 K/UL — SIGNIFICANT CHANGE UP (ref 0–0.2)
BASOPHILS NFR BLD AUTO: 0.4 % — SIGNIFICANT CHANGE UP (ref 0–2)
BILIRUB SERPL-MCNC: 0.5 MG/DL — SIGNIFICANT CHANGE UP (ref 0.2–1.2)
BUN SERPL-MCNC: 21 MG/DL — SIGNIFICANT CHANGE UP (ref 7–23)
BUN SERPL-MCNC: 27 MG/DL — HIGH (ref 7–23)
CALCIUM SERPL-MCNC: 7.6 MG/DL — LOW (ref 8.5–10.1)
CALCIUM SERPL-MCNC: 8.3 MG/DL — LOW (ref 8.5–10.1)
CHLORIDE SERPL-SCNC: 109 MMOL/L — HIGH (ref 96–108)
CHLORIDE SERPL-SCNC: 111 MMOL/L — HIGH (ref 96–108)
CO2 SERPL-SCNC: 23 MMOL/L — SIGNIFICANT CHANGE UP (ref 22–31)
CO2 SERPL-SCNC: 26 MMOL/L — SIGNIFICANT CHANGE UP (ref 22–31)
CREAT SERPL-MCNC: 0.67 MG/DL — SIGNIFICANT CHANGE UP (ref 0.5–1.3)
CREAT SERPL-MCNC: 0.72 MG/DL — SIGNIFICANT CHANGE UP (ref 0.5–1.3)
EGFR: 106 ML/MIN/1.73M2 — SIGNIFICANT CHANGE UP
EGFR: 108 ML/MIN/1.73M2 — SIGNIFICANT CHANGE UP
EOSINOPHIL # BLD AUTO: 0.1 K/UL — SIGNIFICANT CHANGE UP (ref 0–0.5)
EOSINOPHIL NFR BLD AUTO: 1.1 % — SIGNIFICANT CHANGE UP (ref 0–6)
FERRITIN SERPL-MCNC: 93 NG/ML — SIGNIFICANT CHANGE UP (ref 30–400)
FOLATE SERPL-MCNC: 19.6 NG/ML — SIGNIFICANT CHANGE UP
GLUCOSE SERPL-MCNC: 74 MG/DL — SIGNIFICANT CHANGE UP (ref 70–99)
GLUCOSE SERPL-MCNC: 85 MG/DL — SIGNIFICANT CHANGE UP (ref 70–99)
HCT VFR BLD CALC: 21.7 % — LOW (ref 39–50)
HCT VFR BLD CALC: 28 % — LOW (ref 39–50)
HCV AB S/CO SERPL IA: 0.06 S/CO — SIGNIFICANT CHANGE UP (ref 0–0.99)
HCV AB SERPL-IMP: SIGNIFICANT CHANGE UP
HGB BLD-MCNC: 7.5 G/DL — LOW (ref 13–17)
HGB BLD-MCNC: 9.6 G/DL — LOW (ref 13–17)
IMM GRANULOCYTES NFR BLD AUTO: 0.7 % — SIGNIFICANT CHANGE UP (ref 0–0.9)
LYMPHOCYTES # BLD AUTO: 2.19 K/UL — SIGNIFICANT CHANGE UP (ref 1–3.3)
LYMPHOCYTES # BLD AUTO: 24.3 % — SIGNIFICANT CHANGE UP (ref 13–44)
MAGNESIUM SERPL-MCNC: 2 MG/DL — SIGNIFICANT CHANGE UP (ref 1.6–2.6)
MCHC RBC-ENTMCNC: 31.3 PG — SIGNIFICANT CHANGE UP (ref 27–34)
MCHC RBC-ENTMCNC: 32.1 PG — SIGNIFICANT CHANGE UP (ref 27–34)
MCHC RBC-ENTMCNC: 34.3 G/DL — SIGNIFICANT CHANGE UP (ref 32–36)
MCHC RBC-ENTMCNC: 34.6 G/DL — SIGNIFICANT CHANGE UP (ref 32–36)
MCV RBC AUTO: 91.2 FL — SIGNIFICANT CHANGE UP (ref 80–100)
MCV RBC AUTO: 92.7 FL — SIGNIFICANT CHANGE UP (ref 80–100)
MONOCYTES # BLD AUTO: 0.49 K/UL — SIGNIFICANT CHANGE UP (ref 0–0.9)
MONOCYTES NFR BLD AUTO: 5.4 % — SIGNIFICANT CHANGE UP (ref 2–14)
NEUTROPHILS # BLD AUTO: 6.12 K/UL — SIGNIFICANT CHANGE UP (ref 1.8–7.4)
NEUTROPHILS NFR BLD AUTO: 68.1 % — SIGNIFICANT CHANGE UP (ref 43–77)
NRBC # BLD: 0 /100 WBCS — SIGNIFICANT CHANGE UP (ref 0–0)
NRBC # BLD: 0 /100 WBCS — SIGNIFICANT CHANGE UP (ref 0–0)
PHOSPHATE SERPL-MCNC: 2.7 MG/DL — SIGNIFICANT CHANGE UP (ref 2.5–4.5)
PLATELET # BLD AUTO: 157 K/UL — SIGNIFICANT CHANGE UP (ref 150–400)
PLATELET # BLD AUTO: 183 K/UL — SIGNIFICANT CHANGE UP (ref 150–400)
POTASSIUM SERPL-MCNC: 3.1 MMOL/L — LOW (ref 3.5–5.3)
POTASSIUM SERPL-MCNC: 4.2 MMOL/L — SIGNIFICANT CHANGE UP (ref 3.5–5.3)
POTASSIUM SERPL-SCNC: 3.1 MMOL/L — LOW (ref 3.5–5.3)
POTASSIUM SERPL-SCNC: 4.2 MMOL/L — SIGNIFICANT CHANGE UP (ref 3.5–5.3)
PROT SERPL-MCNC: 4.9 GM/DL — LOW (ref 6–8.3)
RBC # BLD: 2.34 M/UL — LOW (ref 4.2–5.8)
RBC # BLD: 3.07 M/UL — LOW (ref 4.2–5.8)
RBC # FLD: 13.6 % — SIGNIFICANT CHANGE UP (ref 10.3–14.5)
RBC # FLD: 13.6 % — SIGNIFICANT CHANGE UP (ref 10.3–14.5)
SODIUM SERPL-SCNC: 137 MMOL/L — SIGNIFICANT CHANGE UP (ref 135–145)
SODIUM SERPL-SCNC: 140 MMOL/L — SIGNIFICANT CHANGE UP (ref 135–145)
VIT B12 SERPL-MCNC: 319 PG/ML — SIGNIFICANT CHANGE UP (ref 232–1245)
WBC # BLD: 9 K/UL — SIGNIFICANT CHANGE UP (ref 3.8–10.5)
WBC # BLD: 9.17 K/UL — SIGNIFICANT CHANGE UP (ref 3.8–10.5)
WBC # FLD AUTO: 9 K/UL — SIGNIFICANT CHANGE UP (ref 3.8–10.5)
WBC # FLD AUTO: 9.17 K/UL — SIGNIFICANT CHANGE UP (ref 3.8–10.5)

## 2023-05-06 PROCEDURE — 99233 SBSQ HOSP IP/OBS HIGH 50: CPT

## 2023-05-06 PROCEDURE — 71045 X-RAY EXAM CHEST 1 VIEW: CPT | Mod: 26

## 2023-05-06 PROCEDURE — 99232 SBSQ HOSP IP/OBS MODERATE 35: CPT

## 2023-05-06 RX ORDER — POTASSIUM CHLORIDE 20 MEQ
40 PACKET (EA) ORAL ONCE
Refills: 0 | Status: COMPLETED | OUTPATIENT
Start: 2023-05-06 | End: 2023-05-06

## 2023-05-06 RX ADMIN — PANTOPRAZOLE SODIUM 40 MILLIGRAM(S): 20 TABLET, DELAYED RELEASE ORAL at 17:25

## 2023-05-06 RX ADMIN — Medication 40 MILLIEQUIVALENT(S): at 06:07

## 2023-05-06 RX ADMIN — PANTOPRAZOLE SODIUM 40 MILLIGRAM(S): 20 TABLET, DELAYED RELEASE ORAL at 05:32

## 2023-05-06 RX ADMIN — CHLORHEXIDINE GLUCONATE 1 APPLICATION(S): 213 SOLUTION TOPICAL at 05:54

## 2023-05-07 LAB
ANION GAP SERPL CALC-SCNC: 1 MMOL/L — LOW (ref 5–17)
BUN SERPL-MCNC: 16 MG/DL — SIGNIFICANT CHANGE UP (ref 7–23)
CALCIUM SERPL-MCNC: 8.3 MG/DL — LOW (ref 8.5–10.1)
CHLORIDE SERPL-SCNC: 109 MMOL/L — HIGH (ref 96–108)
CO2 SERPL-SCNC: 29 MMOL/L — SIGNIFICANT CHANGE UP (ref 22–31)
CREAT SERPL-MCNC: 0.77 MG/DL — SIGNIFICANT CHANGE UP (ref 0.5–1.3)
D DIMER BLD IA.RAPID-MCNC: 353 NG/ML DDU — HIGH
EGFR: 104 ML/MIN/1.73M2 — SIGNIFICANT CHANGE UP
GLUCOSE SERPL-MCNC: 97 MG/DL — SIGNIFICANT CHANGE UP (ref 70–99)
HCT VFR BLD CALC: 26.5 % — LOW (ref 39–50)
HGB BLD-MCNC: 9.3 G/DL — LOW (ref 13–17)
MAGNESIUM SERPL-MCNC: 2.2 MG/DL — SIGNIFICANT CHANGE UP (ref 1.6–2.6)
MCHC RBC-ENTMCNC: 32.1 PG — SIGNIFICANT CHANGE UP (ref 27–34)
MCHC RBC-ENTMCNC: 35.1 G/DL — SIGNIFICANT CHANGE UP (ref 32–36)
MCV RBC AUTO: 91.4 FL — SIGNIFICANT CHANGE UP (ref 80–100)
NRBC # BLD: 0 /100 WBCS — SIGNIFICANT CHANGE UP (ref 0–0)
NT-PROBNP SERPL-SCNC: 60 PG/ML — SIGNIFICANT CHANGE UP (ref 0–125)
PHOSPHATE SERPL-MCNC: 4 MG/DL — SIGNIFICANT CHANGE UP (ref 2.5–4.5)
PLATELET # BLD AUTO: 197 K/UL — SIGNIFICANT CHANGE UP (ref 150–400)
POTASSIUM SERPL-MCNC: 3.4 MMOL/L — LOW (ref 3.5–5.3)
POTASSIUM SERPL-SCNC: 3.4 MMOL/L — LOW (ref 3.5–5.3)
RBC # BLD: 2.9 M/UL — LOW (ref 4.2–5.8)
RBC # FLD: 14.1 % — SIGNIFICANT CHANGE UP (ref 10.3–14.5)
SODIUM SERPL-SCNC: 139 MMOL/L — SIGNIFICANT CHANGE UP (ref 135–145)
WBC # BLD: 8.91 K/UL — SIGNIFICANT CHANGE UP (ref 3.8–10.5)
WBC # FLD AUTO: 8.91 K/UL — SIGNIFICANT CHANGE UP (ref 3.8–10.5)

## 2023-05-07 PROCEDURE — 99233 SBSQ HOSP IP/OBS HIGH 50: CPT

## 2023-05-07 RX ORDER — POTASSIUM CHLORIDE 20 MEQ
40 PACKET (EA) ORAL ONCE
Refills: 0 | Status: COMPLETED | OUTPATIENT
Start: 2023-05-07 | End: 2023-05-07

## 2023-05-07 RX ORDER — POTASSIUM CHLORIDE 20 MEQ
10 PACKET (EA) ORAL
Refills: 0 | Status: COMPLETED | OUTPATIENT
Start: 2023-05-07 | End: 2023-05-07

## 2023-05-07 RX ADMIN — Medication 100 MILLIEQUIVALENT(S): at 08:43

## 2023-05-07 RX ADMIN — PANTOPRAZOLE SODIUM 40 MILLIGRAM(S): 20 TABLET, DELAYED RELEASE ORAL at 05:35

## 2023-05-07 RX ADMIN — Medication 100 MILLIEQUIVALENT(S): at 06:27

## 2023-05-07 RX ADMIN — CHLORHEXIDINE GLUCONATE 1 APPLICATION(S): 213 SOLUTION TOPICAL at 05:35

## 2023-05-07 RX ADMIN — PANTOPRAZOLE SODIUM 40 MILLIGRAM(S): 20 TABLET, DELAYED RELEASE ORAL at 18:58

## 2023-05-07 RX ADMIN — Medication 40 MILLIEQUIVALENT(S): at 10:00

## 2023-05-07 NOTE — PROGRESS NOTE ADULT - NS ATTEST RISK PROBLEM GEN_ALL_CORE FT
Acute blood loss anemia, required blood transfusion, h/h now stable.
Acute blood loss anemia requiring prbc and repeated H/H.

## 2023-05-08 VITALS
RESPIRATION RATE: 18 BRPM | TEMPERATURE: 97 F | SYSTOLIC BLOOD PRESSURE: 114 MMHG | HEART RATE: 90 BPM | OXYGEN SATURATION: 94 % | DIASTOLIC BLOOD PRESSURE: 72 MMHG

## 2023-05-08 LAB
ALBUMIN SERPL ELPH-MCNC: 2.9 G/DL — LOW (ref 3.3–5)
ALP SERPL-CCNC: 38 U/L — LOW (ref 40–120)
ALT FLD-CCNC: 22 U/L — SIGNIFICANT CHANGE UP (ref 12–78)
ANION GAP SERPL CALC-SCNC: 4 MMOL/L — LOW (ref 5–17)
AST SERPL-CCNC: 12 U/L — LOW (ref 15–37)
BASOPHILS # BLD AUTO: 0.03 K/UL — SIGNIFICANT CHANGE UP (ref 0–0.2)
BASOPHILS NFR BLD AUTO: 0.4 % — SIGNIFICANT CHANGE UP (ref 0–2)
BILIRUB SERPL-MCNC: 0.3 MG/DL — SIGNIFICANT CHANGE UP (ref 0.2–1.2)
BUN SERPL-MCNC: 19 MG/DL — SIGNIFICANT CHANGE UP (ref 7–23)
CALCIUM SERPL-MCNC: 8.3 MG/DL — LOW (ref 8.5–10.1)
CHLORIDE SERPL-SCNC: 109 MMOL/L — HIGH (ref 96–108)
CO2 SERPL-SCNC: 27 MMOL/L — SIGNIFICANT CHANGE UP (ref 22–31)
CREAT SERPL-MCNC: 0.7 MG/DL — SIGNIFICANT CHANGE UP (ref 0.5–1.3)
EGFR: 107 ML/MIN/1.73M2 — SIGNIFICANT CHANGE UP
EOSINOPHIL # BLD AUTO: 0.22 K/UL — SIGNIFICANT CHANGE UP (ref 0–0.5)
EOSINOPHIL NFR BLD AUTO: 3.1 % — SIGNIFICANT CHANGE UP (ref 0–6)
GLUCOSE SERPL-MCNC: 92 MG/DL — SIGNIFICANT CHANGE UP (ref 70–99)
HCT VFR BLD CALC: 26 % — LOW (ref 39–50)
HGB BLD-MCNC: 9 G/DL — LOW (ref 13–17)
IMM GRANULOCYTES NFR BLD AUTO: 0.7 % — SIGNIFICANT CHANGE UP (ref 0–0.9)
LYMPHOCYTES # BLD AUTO: 2.39 K/UL — SIGNIFICANT CHANGE UP (ref 1–3.3)
LYMPHOCYTES # BLD AUTO: 34 % — SIGNIFICANT CHANGE UP (ref 13–44)
MAGNESIUM SERPL-MCNC: 2.2 MG/DL — SIGNIFICANT CHANGE UP (ref 1.6–2.6)
MCHC RBC-ENTMCNC: 31.5 PG — SIGNIFICANT CHANGE UP (ref 27–34)
MCHC RBC-ENTMCNC: 34.6 G/DL — SIGNIFICANT CHANGE UP (ref 32–36)
MCV RBC AUTO: 90.9 FL — SIGNIFICANT CHANGE UP (ref 80–100)
MONOCYTES # BLD AUTO: 0.53 K/UL — SIGNIFICANT CHANGE UP (ref 0–0.9)
MONOCYTES NFR BLD AUTO: 7.5 % — SIGNIFICANT CHANGE UP (ref 2–14)
NEUTROPHILS # BLD AUTO: 3.8 K/UL — SIGNIFICANT CHANGE UP (ref 1.8–7.4)
NEUTROPHILS NFR BLD AUTO: 54.3 % — SIGNIFICANT CHANGE UP (ref 43–77)
NRBC # BLD: 0 /100 WBCS — SIGNIFICANT CHANGE UP (ref 0–0)
PHOSPHATE SERPL-MCNC: 3.8 MG/DL — SIGNIFICANT CHANGE UP (ref 2.5–4.5)
PLATELET # BLD AUTO: 221 K/UL — SIGNIFICANT CHANGE UP (ref 150–400)
POTASSIUM SERPL-MCNC: 3.5 MMOL/L — SIGNIFICANT CHANGE UP (ref 3.5–5.3)
POTASSIUM SERPL-SCNC: 3.5 MMOL/L — SIGNIFICANT CHANGE UP (ref 3.5–5.3)
PROT SERPL-MCNC: 5.3 GM/DL — LOW (ref 6–8.3)
RBC # BLD: 2.86 M/UL — LOW (ref 4.2–5.8)
RBC # FLD: 14.6 % — HIGH (ref 10.3–14.5)
SODIUM SERPL-SCNC: 140 MMOL/L — SIGNIFICANT CHANGE UP (ref 135–145)
WBC # BLD: 7.02 K/UL — SIGNIFICANT CHANGE UP (ref 3.8–10.5)
WBC # FLD AUTO: 7.02 K/UL — SIGNIFICANT CHANGE UP (ref 3.8–10.5)

## 2023-05-08 PROCEDURE — 99232 SBSQ HOSP IP/OBS MODERATE 35: CPT

## 2023-05-08 PROCEDURE — 99238 HOSP IP/OBS DSCHRG MGMT 30/<: CPT

## 2023-05-08 RX ORDER — POTASSIUM CHLORIDE 20 MEQ
40 PACKET (EA) ORAL ONCE
Refills: 0 | Status: COMPLETED | OUTPATIENT
Start: 2023-05-08 | End: 2023-05-08

## 2023-05-08 RX ORDER — PANTOPRAZOLE SODIUM 20 MG/1
1 TABLET, DELAYED RELEASE ORAL
Qty: 30 | Refills: 0
Start: 2023-05-08

## 2023-05-08 RX ORDER — PANTOPRAZOLE SODIUM 20 MG/1
1 TABLET, DELAYED RELEASE ORAL
Qty: 0 | Refills: 0 | DISCHARGE
Start: 2023-05-08

## 2023-05-08 RX ORDER — PANTOPRAZOLE SODIUM 20 MG/1
40 TABLET, DELAYED RELEASE ORAL
Refills: 0 | Status: DISCONTINUED | OUTPATIENT
Start: 2023-05-08 | End: 2023-05-08

## 2023-05-08 RX ORDER — OMEPRAZOLE 10 MG/1
0 CAPSULE, DELAYED RELEASE ORAL
Refills: 0 | DISCHARGE

## 2023-05-08 RX ADMIN — CHLORHEXIDINE GLUCONATE 1 APPLICATION(S): 213 SOLUTION TOPICAL at 11:23

## 2023-05-08 RX ADMIN — Medication 40 MILLIEQUIVALENT(S): at 05:26

## 2023-05-08 RX ADMIN — PANTOPRAZOLE SODIUM 40 MILLIGRAM(S): 20 TABLET, DELAYED RELEASE ORAL at 05:26

## 2023-05-08 NOTE — DISCHARGE NOTE PROVIDER - HOSPITAL COURSE
58 yo M (visiting from [facility]) with GERD a/w melena and abdominal pain. Admitted to medicine with hgb 6.9, not responsive to 2 PRBCS. EGD performed by GI today showing 1.5 cm duodonal ulcer with visible vessel that was cauterized. Biopsy sent for H.Pylori testing w/ GI service. ICU consulted for hemodynamic monitoring post procedure. Tolerating PO diet with no active bleeding. Patient stable and clear to for discharge. Can follow up with PCP in [facility] once home. One month supply of PPI sent to pharmacy nearby.

## 2023-05-08 NOTE — PROGRESS NOTE ADULT - SUBJECTIVE AND OBJECTIVE BOX
HPI:  56yo male with pmh gerd presenting with black stools.  For several weeks/ months patient has been having intermittent upper abdominal pain and has been taking omeprazole with improvement.  Had worsening pain sunday which has since improved but now denies pain.  Over past 3-4 days has been having black stools which has never happened before.  No brbpr.  No fevers, n/v.  No pshx (04 May 2023 23:41)      24 hr events: No acute events. Feels well. Wants to go home. No chest pain, dyspnea, fevers, chills, nausea, emesis, hematochezia or melena.     ## ROS:  See above. ROS otherwise negative    ## Vitals  ICU Vital Signs Last 24 Hrs  T(C): 36.1 (08 May 2023 04:05), Max: 36.8 (07 May 2023 15:47)  T(F): 97 (08 May 2023 04:05), Max: 98.2 (07 May 2023 15:47)  HR: 89 (08 May 2023 07:15) (67 - 112)  BP: 101/61 (08 May 2023 04:05) (91/57 - 126/83)  BP(mean): 69 (08 May 2023 04:05) (65 - 94)  ABP: --  ABP(mean): --  RR: 18 (08 May 2023 07:15) (12 - 23)  SpO2: 97% (08 May 2023 07:15) (89% - 97%)    O2 Parameters below as of 08 May 2023 07:15  Patient On (Oxygen Delivery Method): nasal cannula  O2 Flow (L/min): 3          ## Physical Exam:  Gen: Adult male sitting comfortably in bed, NAD  HEENT: NC/AT sclerae anicteric  Resp: No increased WOB, CTAB  CV: S1, S2  Abd: Soft, + BS  Ext: WWP  Neuro: Awake, alert, follows commands  Psych: Appropriate affect    ## Vent Data      ## Labs:  Chem:  05-08    140  |  109<H>  |  19  ----------------------------<  92  3.5   |  27  |  0.70    Ca    8.3<L>      08 May 2023 02:30  Phos  3.8     05-08  Mg     2.2     05-08    TPro  5.3<L>  /  Alb  2.9<L>  /  TBili  0.3  /  DBili  x   /  AST  12<L>  /  ALT  22  /  AlkPhos  38<L>  05-08    LIVER FUNCTIONS - ( 08 May 2023 02:30 )  Alb: 2.9 g/dL / Pro: 5.3 gm/dL / ALK PHOS: 38 U/L / ALT: 22 U/L / AST: 12 U/L / GGT: x           CBC:                        9.0    7.02  )-----------( 221      ( 08 May 2023 02:30 )             26.0     Coags:          ## Cardiac        ## Blood Gas      #I/Os  I&O's Detail    07 May 2023 07:01  -  08 May 2023 07:00  --------------------------------------------------------  IN:    IV PiggyBack: 200 mL    Oral Fluid: 900 mL  Total IN: 1100 mL    OUT:    Voided (mL): 2100 mL  Total OUT: 2100 mL    Total NET: -1000 mL          ## Imaging:    ## Medications:  MEDICATIONS  (STANDING):  chlorhexidine 2% Cloths 1 Application(s) Topical <User Schedule>  pantoprazole    Tablet 40 milliGRAM(s) Oral two times a day    MEDICATIONS  (PRN):  ondansetron Injectable 4 milliGRAM(s) IV Push every 6 hours PRN Nausea      
No further melena  s/p EGD yesterday with a DU with vv that was epi/bipolar cautery with good effect  Received 1 more unit PRBC for hgb 7.5    T(C): 36.7 (05-06-23 @ 16:44), Max: 37.1 (05-05-23 @ 19:31)  HR: 92 (05-06-23 @ 17:00) (64 - 104)  BP: 119/63 (05-06-23 @ 17:00) (97/71 - 133/77)  RR: 21 (05-06-23 @ 17:00) (12 - 21)  SpO2: 94% (05-06-23 @ 17:00) (78% - 100%)    NAD  soft NT ND                          9.6    9.17  )-----------( 183      ( 06 May 2023 13:30 )             28.0   05-06    140  |  111<H>  |  21  ----------------------------<  85  4.2   |  26  |  0.72    Ca    8.3<L>      06 May 2023 13:30  Phos  2.7     05-06  Mg     2.0     05-06    TPro  4.9<L>  /  Alb  2.7<L>  /  TBili  0.5  /  DBili  x   /  AST  15  /  ALT  17  /  AlkPhos  35<L>  05-06    Impression: UGIB 2/2 duodenal ulcer. Stable, with no further bleeding.     Recommend:   - IV BID PPI 40mg  - trend H/H  - await biopsies, treat H. pylori if positive  - advance diet    Discussed with intensivist  Appreciate ICU care    
Patient had a normal bm yesterday  Tolerating PO diet    T(C): 36.1 (05-08-23 @ 04:05), Max: 36.8 (05-07-23 @ 15:47)  HR: 89 (05-08-23 @ 07:15) (67 - 112)  BP: 101/61 (05-08-23 @ 04:05) (91/57 - 126/83)  RR: 18 (05-08-23 @ 07:15) (12 - 23)  SpO2: 97% (05-08-23 @ 07:15) (89% - 97%)    NAD  soft NT ND                          9.0    7.02  )-----------( 221      ( 08 May 2023 02:30 )             26.0   05-08    140  |  109<H>  |  19  ----------------------------<  92  3.5   |  27  |  0.70    Ca    8.3<L>      08 May 2023 02:30  Phos  3.8     05-08  Mg     2.2     05-08    TPro  5.3<L>  /  Alb  2.9<L>  /  TBili  0.3  /  DBili  x   /  AST  12<L>  /  ALT  22  /  AlkPhos  38<L>  05-08    Impression: 57M with UGIB from duodenal ulcer with visible vessel, s/p epi and cautery on 5/5, doing well.     Recommend:   - can d/c on once daily PPI 40mg  - await pathology, will treat H. pylori if positive - I told pt I will call him if positive  - can follow up with PCP in [facility]    Discussed with intensivist by Teams
INTERVAL HPI/OVERNIGHT EVENTS:    Interviewed patient with phone  ID # [id number] Edward.  Noted to have drop in hgb overnight; transfused this AM; no melena or coffee ground emesis.      Endoscopy noted mild gastritis of stomach; Duodenum - 1.5cm ulcer in duodenal bulb with surrounding hyperemia and visible vessel within. Treated with 2cc of 1/10,000 epinephrine and bipolar cautery with good effect. D2 normal.    Hemoglobin Trend: 7.5 g/dL (05-06-23 @ 03:38) | 8.1 g/dL (05-05-23 @ 18:55) | 7.8 g/dL (05-05-23 @ 16:07) | 6.5 g/dL (05-05-23 @ 06:35)    Hematocrit Trend:   21.7 % (05-06-23 @ 03:38) | 23.4 % (05-05-23 @ 18:55) | 22.9 % (05-05-23 @ 16:07) | 19.1 % (05-05-23 @ 06:35)    CENTRAL LINE: [ ] YES [x ] NO  LOCATION:       SHEA: [ ] YES [x ] NO        A-LINE:  [ ] YES [x ] NO  LOCATION:       GLOBAL ISSUE/BEST PRACTICE:  Analgesia:   Sedation:  HOB elevation: yes  Stress ulcer prophylaxis: pantoprazole  Injectable    VTE prophylaxis:   Oral Care: Chlorhexidine  Glycemic control:   Nutrition:Diet, Clear Liquid (05-05-23 @ 18:29) [Active]          REVIEW OF SYSTEMS:  [ ] Unable to obtain because:  [x] All other systems negative        PHYSICAL EXAM:    GENERAL: NAD, well-groomed, well-developed  HEAD:  Atraumatic, Normocephalic  EYES: EOMI, PERRLA, conjunctiva and sclera clear  ENMT: No tonsillar erythema, exudates, or enlargement; Moist mucous membranes, No lesions  NECK: Supple, No JVD, Normal thyroid  CHEST/LUNG: Clear to auscultation bilaterally; No rales, rhonchi, wheezing, or rubs  HEART: Regular rate and rhythm; No murmurs, rubs, or gallops  ABDOMEN: Soft, Nontender, Nondistended; Bowel sounds present  EXTREMITIES:  2+ Peripheral Pulses, No clubbing, cyanosis, or edema  LYMPH: No lymphadenopathy noted  SKIN: No rashes or lesions  NERVOUS SYSTEM:  Alert & Oriented X3, Good concentration; Motor Strength 5/5 B/L upper and lower extremities; DTRs 2+ intact and symmetric    ICU Vital Signs Last 24 Hrs  T(C): 36.7 (06 May 2023 08:00), Max: 37.5 (05 May 2023 16:55)  T(F): 98 (06 May 2023 08:00), Max: 99.5 (05 May 2023 16:55)  HR: 87 (06 May 2023 08:00) (64 - 104)  BP: 101/69 (06 May 2023 08:00) (100/53 - 120/83)  BP(mean): 75 (06 May 2023 08:00) (64 - 89)  ABP: --  ABP(mean): --  RR: 16 (06 May 2023 08:00) (13 - 19)  SpO2: 96% (06 May 2023 08:00) (78% - 100%)    O2 Parameters below as of 05 May 2023 18:22  Patient On (Oxygen Delivery Method): room air          I&O's Detail    05 May 2023 07:01  -  06 May 2023 07:00  --------------------------------------------------------  IN:    Oral Fluid: 300 mL  Total IN: 300 mL    OUT:    Voided (mL): 750 mL  Total OUT: 750 mL    Total NET: -450 mL        MEDICATIONS  NEURO  Meds: ondansetron Injectable 4 milliGRAM(s) IV Push every 6 hours PRN Nausea    RESPIRATORY    Meds:   CARDIOVASCULAR  Meds:   GI/NUTRITION  Meds: pantoprazole  Injectable 40 milliGRAM(s) IV Push every 12 hours    GENITOURINARY  Meds:   HEMATOLOGIC  Meds:   [x] VTE Prophylaxis  INFECTIOUS DISEASES  Meds:   ENDOCRINE  CAPILLARY BLOOD GLUCOSE        Meds:   OTHER MEDICATIONS:  chlorhexidine 2% Cloths 1 Application(s) Topical <User Schedule>  :    LABS:                        7.5    9.00  )-----------( 157      ( 06 May 2023 03:38 )             21.7      05-06    137  |  109<H>  |  27<H>  ----------------------------<  74  3.1<L>   |  23  |  0.67    Ca    7.6<L>      06 May 2023 03:38  Phos  2.7     05-06  Mg     2.0     05-06    TPro  4.9<L>  /  Alb  2.7<L>  /  TBili  0.5  /  DBili  x   /  AST  15  /  ALT  17  /  AlkPhos  35<L>  05-06    PT/INR - ( 05 May 2023 06:35 )   PT: 14.0 sec;   INR: 1.16 ratio         PTT - ( 05 May 2023 06:35 )  PTT:27.6 sec      RADIOLOGY & ADDITIONAL STUDIES:    
INTERVAL HPI/OVERNIGHT EVENTS:    Noted to have mild hypoxia overnight.  D-dimer neg, chest xray performed with no acute infiltrates.  No signs of respiratory distress.  Provided incentive spirometry, and encouraged out of bed to chair.      CENTRAL LINE: [ ] YES [x ] NO  LOCATION:       SHEA: [ ] YES [x ] NO        A-LINE:  [ ] YES [x ] NO  LOCATION:       GLOBAL ISSUE/BEST PRACTICE:  Analgesia:   Sedation:  HOB elevation: yes  Stress ulcer prophylaxis: pantoprazole  Injectable    VTE prophylaxis:   Oral Care: Chlorhexidine  Glycemic control:   Nutrition:Diet, Clear Liquid (05-05-23 @ 18:29) [Active]          REVIEW OF SYSTEMS:  [ ] Unable to obtain because:  [x] All other systems negative        PHYSICAL EXAM:    GENERAL: NAD, well-groomed, well-developed  HEAD:  Atraumatic, Normocephalic  EYES: EOMI, PERRLA, conjunctiva and sclera clear  ENMT: No tonsillar erythema, exudates, or enlargement; Moist mucous membranes, No lesions  NECK: Supple, No JVD, Normal thyroid  CHEST/LUNG: Clear to auscultation bilaterally; No rales, rhonchi, wheezing, or rubs  HEART: Regular rate and rhythm; No murmurs, rubs, or gallops  ABDOMEN: Soft, Nontender, Nondistended; Bowel sounds present  EXTREMITIES:  2+ Peripheral Pulses, No clubbing, cyanosis, or edema  LYMPH: No lymphadenopathy noted  SKIN: No rashes or lesions  NERVOUS SYSTEM:  Alert & Oriented X3, Good concentration; Motor Strength 5/5 B/L upper and lower extremities; DTRs 2+ intact and symmetric    ICU Vital Signs Last 24 Hrs  T(C): 36.7 (07 May 2023 04:00), Max: 37.1 (06 May 2023 19:13)  T(F): 98.1 (07 May 2023 04:00), Max: 98.7 (06 May 2023 19:13)  HR: 90 (07 May 2023 10:00) (57 - 101)  BP: 114/65 (07 May 2023 10:00) (92/53 - 133/77)  BP(mean): 76 (07 May 2023 10:00) (63 - 102)  ABP: --  ABP(mean): --  RR: 16 (07 May 2023 10:00) (11 - 21)  SpO2: 90% (07 May 2023 10:00) (88% - 100%)    O2 Parameters below as of 07 May 2023 05:00  Patient On (Oxygen Delivery Method): nasal cannula  O2 Flow (L/min): 4        I&O's Detail    06 May 2023 07:01  -  07 May 2023 07:00  --------------------------------------------------------  IN:    PRBCs (Packed Red Blood Cells): 350 mL  Total IN: 350 mL    OUT:    Voided (mL): 2650 mL  Total OUT: 2650 mL    Total NET: -2300 mL      07 May 2023 07:01  -  07 May 2023 10:23  --------------------------------------------------------  IN:    IV PiggyBack: 200 mL    Oral Fluid: 400 mL  Total IN: 600 mL    OUT:    Voided (mL): 300 mL  Total OUT: 300 mL    Total NET: 300 mL        MEDICATIONS  NEURO  Meds: ondansetron Injectable 4 milliGRAM(s) IV Push every 6 hours PRN Nausea    RESPIRATORY    Meds:   CARDIOVASCULAR  Meds:   GI/NUTRITION  Meds: pantoprazole  Injectable 40 milliGRAM(s) IV Push every 12 hours    GENITOURINARY  Meds:   HEMATOLOGIC  Meds:   [x] VTE Prophylaxis  INFECTIOUS DISEASES  Meds:   ENDOCRINE  CAPILLARY BLOOD GLUCOSE        Meds:   OTHER MEDICATIONS:  chlorhexidine 2% Cloths 1 Application(s) Topical <User Schedule>  :    LABS:                        9.3    8.91  )-----------( 197      ( 07 May 2023 02:45 )             26.5     Hemoglobin Trend: 9.3 g/dL (05-07-23 @ 02:45)  9.6 g/dL (05-06-23 @ 13:30)  7.5 g/dL (05-06-23 @ 03:38)  8.1 g/dL (05-05-23 @ 18:55)    Hematocrit Trend:   26.5 % (05-07-23 @ 02:45)  28.0 % (05-06-23 @ 13:30)  21.7 % (05-06-23 @ 03:38)  23.4 % (05-05-23 @ 18:55)     05-07    139  |  109<H>  |  16  ----------------------------<  97  3.4<L>   |  29  |  0.77    Ca    8.3<L>      07 May 2023 02:45  Phos  4.0     05-07  Mg     2.2     05-07    TPro  4.9<L>  /  Alb  2.7<L>  /  TBili  0.5  /  DBili  x   /  AST  15  /  ALT  17  /  AlkPhos  35<L>  05-06      RADIOLOGY & ADDITIONAL STUDIES:    
Patient is a 57y old  Male who presents with a chief complaint of Melanotic stools (04 May 2023 23:41)      INTERVAL HPI/OVERNIGHT EVENTS:  Exam was tdone via  Frank #[id number]. Pt states He had abd pain on Sun, then had black stool from Monday. He felt tired and SOB. No BMs since yesterday. He had palpitations yesterday but not having it now.      REVIEW OF SYSTEMS:  CONSTITUTIONAL: No fever, weight loss, +fatigue  EYES: No eye pain, visual disturbances, or discharge  ENMT:  No difficulty hearing, tinnitus, vertigo; No sinus or throat pain  NECK: No pain or stiffness  RESPIRATORY: No cough, wheezing, chills or hemoptysis; +shortness of breath  CARDIOVASCULAR: No chest pain, palpitations, dizziness, or leg swelling  GASTROINTESTINAL: No abdominal +epigastric pain. No nausea, vomiting, or hematemesis; No diarrhea or constipation. +melena   GENITOURINARY: No dysuria, frequency, hematuria, or incontinence  NEUROLOGICAL: No headaches, memory loss, loss of strength, numbness, or tremors  SKIN: No itching, burning, rashes, or lesions   LYMPH NODES: No enlarged glands  ENDOCRINE: No heat or cold intolerance; No hair loss  MUSCULOSKELETAL: No joint pain or swelling; No muscle, back, or extremity pain  HEME/LYMPH: No easy bruising, or bleeding gums  ALLERY AND IMMUNOLOGIC: No hives or eczema    FAMILY HISTORY:  No pertinent family history in first degree relatives      Vital Signs Last 24 Hrs  T(C): 36.9 (05 May 2023 05:38), Max: 37.5 (04 May 2023 23:35)  T(F): 98.4 (05 May 2023 05:38), Max: 99.5 (04 May 2023 23:35)  HR: 94 (05 May 2023 05:38) (79 - 143)  BP: 109/60 (05 May 2023 05:38) (100/60 - 124/65)  BP(mean): --  RR: 18 (05 May 2023 05:38) (16 - 18)  SpO2: 99% (05 May 2023 05:38) (97% - 100%)    Parameters below as of 05 May 2023 02:15  Patient On (Oxygen Delivery Method): room air          PHYSICAL EXAM:  GENERAL: NAD, well-groomed, well-developed  HEAD:  Atraumatic, Normocephalic  EYES: EOMI, conjunctiva and sclera clear  ENMT: No tonsillar erythema, exudates, or enlargement; Moist mucous membranes  NECK: Supple, No JVD, Normal thyroid  NERVOUS SYSTEM:  Alert & Oriented X3, Good concentration; Moving all extremities   CHEST/LUNG:  No rales, rhonchi, wheezing, or rubs  HEART: Regular rate and rhythm; No murmurs, rubs, or gallops  ABDOMEN: Soft, Nontender, Nondistended; Bowel sounds present  EXTREMITIES:  2+ Peripheral Pulses, No clubbing, cyanosis, or edema  LYMPH: No lymphadenopathy noted  SKIN: No rashes or lesions    Consultant(s) Notes Reviewed:  [x ] YES  [ ] NO  Care Discussed with Consultants/Other Providers [ x] YES  [ ] NO    LABS:        RADIOLOGY & ADDITIONAL TESTS:  < from: CT Angio Abdomen and Pelvis w/ IV Cont (05.04.23 @ 20:18) >  FINDINGS:  LOWER CHEST: Trace bibasilar atelectasis.    LIVER: Within normal limits.  BILE DUCTS: Normal caliber.  GALLBLADDER: Within normal limits.  SPLEEN: Within normal limits.  PANCREAS: Within normal limits.  ADRENALS: Within normal limits.  KIDNEYS/URETERS: Within normal limits.    BLADDER: Within normal limits.  REPRODUCTIVE ORGANS: Prostate is mildly enlarged.    BOWEL: No bowel obstruction. Colonic diverticulosis. Submucosal fat   deposition within the appendix, which is normal in caliber.  PERITONEUM: No ascites.  VESSELS: Within normal limits.  RETROPERITONEUM/LYMPH NODES: No lymphadenopathy.  ABDOMINAL WALL: Small fat-containing umbilical hernia.  BONES: Mild degenerative changes.    IMPRESSION:  No active GI bleed identified.    Scattered colonic diverticulosis.    < end of copied text >      Imaging Personally Reviewed:  [ ] YES  [ ] NO  ondansetron Injectable 4 milliGRAM(s) IV Push every 6 hours PRN  pantoprazole  Injectable 40 milliGRAM(s) IV Push every 12 hours      HEALTH ISSUES - PROBLEM Dx:  Acute upper GI bleed

## 2023-05-08 NOTE — DISCHARGE NOTE PROVIDER - NSDCCPCAREPLAN_GEN_ALL_CORE_FT
PRINCIPAL DISCHARGE DIAGNOSIS  Diagnosis: Gastrointestinal bleed  Assessment and Plan of Treatment:

## 2023-05-08 NOTE — DISCHARGE NOTE NURSING/CASE MANAGEMENT/SOCIAL WORK - NSDCPEWEB_GEN_ALL_CORE
[facility] website --- [email or web address] website --- [email or web address]

## 2023-05-08 NOTE — PROGRESS NOTE ADULT - ASSESSMENT
56 yo M with GERD a/w melena and abdominal pain. Admitted to medicine with hgb 6.9, not responsive to 2 PRBCS. EGD performed by GI today showing 1.5 cm duodonal ulcer with visible vessel that was cauterized. ICU consulted for hemodynamic monitoring post procedure.     Neuro: AAO x 4, at baseline, no acute findings  CV: HD stable, maintain MAP>65  Pulm: Required O2 overnight for hypoxia to mid 80s, noted to be former smoker, no acute distress, d-dimer neg, a-line predominant on chest US and chest xray neg for acute infiltrates or PTX.   GI: Tolerating clears; PPI BID x 72 hours; no active melena or bloody bowel movements overnight.  Will continue to monitor.    Renal/Metabolic: No acute issues  ID: No acute issues.    Heme: Blood loss anemia 2/2 duodenal ulcer with visible vessel found on EGD, treated with cauterization. Received PRBC yesterday for downtrending Hgb.  H/H now stable.    - cont with SCDs, may restart lovenox in am.   Dispo: Stable for downgrade to floors, FULL CODE.  
58 yo M with GERD a/w melena and abdominal pain. Admitted to medicine with hgb 6.9, not responsive to 2 PRBCS. EGD performed by GI today showing 1.5 cm duodonal ulcer with visible vessel that was cauterized. ICU consulted for hemodynamic monitoring post procedure.     Neuro: AAO x 4, at baseline, no acute findings  CV: HD stable, maintain MAP>65  Pulm: No active issues on room air  GI: Tolerating clears; PPI BID x 72 hours; no active melena or bloody bowel movements overnight.  Will continue to monitor.    Renal/Metabolic: No acute issues  ID: No acute issues.    Heme: Blood loss anemia 2/2 duodenal ulcer with visible vessel found on EGD, treated with cauterization. Receiving PRBC this AM for downtrending Hgb.  Will repeat this afternoon; no signs of overt bleeding at this time, will continue to monitor; trend H/H.  - cont with SCDs  Dispo: Monitor in ICU given concern for ongoing bleeding and cbc monitoring; FULL CODE.        Critical Care Services Provided: I have personally and independently provided ___ minutes of critical care services.  This excludes any time spent on separate procedures or teaching. 
56 y/o M admitted with acute blood loss anemia secondary to GI bleed due to duodenal ulcer s/p cauterization. Hgb now stable.     - Appreciate GI assistance  - PPI daily  - Outpatient follow up, GI to call with results of biopsies    I spent 25 minutes discharging this patient
58yo male with pmh gerd presenting with black stools over past 3-4 days which has never happened before.  Pt with anemia and mild diverticulosis on CT. Admit for Upper GI bleed.

## 2023-05-08 NOTE — DISCHARGE NOTE NURSING/CASE MANAGEMENT/SOCIAL WORK - PATIENT PORTAL LINK FT
You can access the FollowMyHealth Patient Portal offered by [facility] by registering at the following website: [email or web address]. By joining [employer]’s FollowMyHealth portal, you will also be able to view your health information using other applications (apps) compatible with our system.

## 2023-05-08 NOTE — DISCHARGE NOTE NURSING/CASE MANAGEMENT/SOCIAL WORK - NSDCPEEMAIL_GEN_ALL_CORE
[facility] email [email or web address]

## 2023-05-08 NOTE — DISCHARGE NOTE PROVIDER - NSDCCAREPROVSEEN_GEN_ALL_CORE_FT
Nayla Garza- [facility] ICU     Araceli John attending MD  Nayla Garza R- NP ICU   Araceli John attending Ramos Kim - ICU attending

## 2023-05-08 NOTE — DISCHARGE NOTE PROVIDER - NSDCMRMEDTOKEN_GEN_ALL_CORE_FT
omeprazole:   Protonix 40 mg oral delayed release tablet: 1 tab(s) orally once a day please take once a day before meal in AM   pantoprazole 40 mg oral delayed release tablet: 1 tab(s) orally 2 times a day

## 2023-05-08 NOTE — PROGRESS NOTE ADULT - REASON FOR ADMISSION
Melanotic stools

## 2023-05-08 NOTE — DISCHARGE NOTE PROVIDER - NSDCFUADDINST_GEN_ALL_CORE_FT
Please avoid spicy foods.   Follow up with PCP in [facility]   GI doctor will call with biopsy results.

## 2023-05-08 NOTE — PHARMACOTHERAPY INTERVENTION NOTE - COMMENTS
Per policy, pantoprazole 40 mg IVPtwice daily transitioned to oral formulation since the patient is tolerating a diet and/or other medications enterally.

## 2023-05-09 LAB — SURGICAL PATHOLOGY STUDY: SIGNIFICANT CHANGE UP

## 2023-05-11 DIAGNOSIS — D62 ACUTE POSTHEMORRHAGIC ANEMIA: ICD-10-CM

## 2023-05-11 DIAGNOSIS — K21.9 GASTRO-ESOPHAGEAL REFLUX DISEASE WITHOUT ESOPHAGITIS: ICD-10-CM

## 2023-05-11 DIAGNOSIS — K29.70 GASTRITIS, UNSPECIFIED, WITHOUT BLEEDING: ICD-10-CM

## 2023-05-11 DIAGNOSIS — K92.2 GASTROINTESTINAL HEMORRHAGE, UNSPECIFIED: ICD-10-CM

## 2023-05-11 DIAGNOSIS — F17.210 NICOTINE DEPENDENCE, CIGARETTES, UNCOMPLICATED: ICD-10-CM

## 2023-05-11 DIAGNOSIS — K57.90 DIVERTICULOSIS OF INTESTINE, PART UNSPECIFIED, WITHOUT PERFORATION OR ABSCESS WITHOUT BLEEDING: ICD-10-CM

## 2023-05-11 DIAGNOSIS — A04.8 OTHER SPECIFIED BACTERIAL INTESTINAL INFECTIONS: ICD-10-CM

## 2023-05-11 DIAGNOSIS — K26.9 DUODENAL ULCER, UNSPECIFIED AS ACUTE OR CHRONIC, WITHOUT HEMORRHAGE OR PERFORATION: ICD-10-CM

## 2023-05-11 DIAGNOSIS — R10.9 UNSPECIFIED ABDOMINAL PAIN: ICD-10-CM

## 2023-05-15 PROBLEM — Z00.00 ENCOUNTER FOR PREVENTIVE HEALTH EXAMINATION: Status: ACTIVE | Noted: 2023-05-15

## 2023-06-09 DIAGNOSIS — B96.81 PEPTIC ULCER, SITE UNSPECIFIED, UNSPECIFIED AS ACUTE OR CHRONIC, W/OUT HEMORRHAGE OR PERFORATION: ICD-10-CM

## 2023-06-09 DIAGNOSIS — K27.9 PEPTIC ULCER, SITE UNSPECIFIED, UNSPECIFIED AS ACUTE OR CHRONIC, W/OUT HEMORRHAGE OR PERFORATION: ICD-10-CM

## 2023-06-09 RX ORDER — OMEPRAZOLE 40 MG/1
40 CAPSULE, DELAYED RELEASE ORAL TWICE DAILY
Qty: 28 | Refills: 0 | Status: ACTIVE | COMMUNITY
Start: 2023-06-09 | End: 1900-01-01

## 2023-06-09 RX ORDER — BISMUTH SUBSALICYLATE 262 MG/1
262 TABLET, CHEWABLE ORAL 4 TIMES DAILY
Qty: 112 | Refills: 0 | Status: ACTIVE | COMMUNITY
Start: 2023-06-09 | End: 1900-01-01

## 2023-06-09 RX ORDER — DOXYCYCLINE HYCLATE 100 MG/1
100 CAPSULE ORAL
Qty: 28 | Refills: 0 | Status: ACTIVE | COMMUNITY
Start: 2023-06-09 | End: 1900-01-01

## 2023-06-09 RX ORDER — METRONIDAZOLE 250 MG/1
250 TABLET ORAL EVERY 6 HOURS
Qty: 56 | Refills: 0 | Status: ACTIVE | COMMUNITY
Start: 2023-06-09 | End: 1900-01-01

## 2025-07-07 NOTE — DISCHARGE NOTE NURSING/CASE MANAGEMENT/SOCIAL WORK - NSDCPEFALRISK_GEN_ALL_CORE
Consultation Progress Note from [facility] received and placed in PCP APRIL's bin in [facility].   
Received fax from [facility].     Placed in [facility] Nurse Bin.    
For information on Fall & Injury Prevention, visit: [email or web address] OR  [email or web address] OR  https://www.cdc.gov/steadi/patient.html